# Patient Record
Sex: FEMALE | Race: WHITE | NOT HISPANIC OR LATINO | Employment: OTHER | URBAN - NONMETROPOLITAN AREA
[De-identification: names, ages, dates, MRNs, and addresses within clinical notes are randomized per-mention and may not be internally consistent; named-entity substitution may affect disease eponyms.]

---

## 2023-03-29 ENCOUNTER — TELEMEDICINE (OUTPATIENT)
Dept: PRIMARY CARE | Facility: CLINIC | Age: 53
End: 2023-03-29
Payer: MEDICARE

## 2023-03-29 DIAGNOSIS — J44.1 ASTHMA WITH COPD WITH EXACERBATION (MULTI): Primary | ICD-10-CM

## 2023-03-29 DIAGNOSIS — J45.901 ASTHMA WITH COPD WITH EXACERBATION (MULTI): Primary | ICD-10-CM

## 2023-03-29 PROBLEM — R91.8 LUNG NODULES: Status: ACTIVE | Noted: 2023-03-29

## 2023-03-29 PROBLEM — J44.9 COPD (CHRONIC OBSTRUCTIVE PULMONARY DISEASE) (MULTI): Status: ACTIVE | Noted: 2023-03-29

## 2023-03-29 PROBLEM — R09.02 HYPOXIA: Status: ACTIVE | Noted: 2023-03-29

## 2023-03-29 PROBLEM — Z14.8 ALPHA-1-ANTITRYPSIN DEFICIENCY CARRIER: Status: ACTIVE | Noted: 2023-03-29

## 2023-03-29 PROBLEM — J45.909 ASTHMA (HHS-HCC): Status: ACTIVE | Noted: 2023-03-29

## 2023-03-29 PROBLEM — R92.8 ABNORMAL MAMMOGRAM OF LEFT BREAST: Status: ACTIVE | Noted: 2023-03-29

## 2023-03-29 PROBLEM — F17.210 CIGARETTE NICOTINE DEPENDENCE WITHOUT COMPLICATION: Status: ACTIVE | Noted: 2023-03-29

## 2023-03-29 PROCEDURE — 99214 OFFICE O/P EST MOD 30 MIN: CPT | Performed by: PHYSICIAN ASSISTANT

## 2023-03-29 RX ORDER — IPRATROPIUM BROMIDE AND ALBUTEROL 20; 100 UG/1; UG/1
1 SPRAY, METERED RESPIRATORY (INHALATION) 4 TIMES DAILY PRN
COMMUNITY
End: 2023-10-11 | Stop reason: SDUPTHER

## 2023-03-29 RX ORDER — MONTELUKAST SODIUM 10 MG/1
10 TABLET ORAL NIGHTLY
COMMUNITY
End: 2023-07-18 | Stop reason: SDUPTHER

## 2023-03-29 RX ORDER — LORATADINE 10 MG/1
10 TABLET ORAL DAILY
COMMUNITY
End: 2023-07-18 | Stop reason: SDUPTHER

## 2023-03-29 RX ORDER — DOXYCYCLINE 100 MG/1
100 CAPSULE ORAL 2 TIMES DAILY
Qty: 20 CAPSULE | Refills: 0 | Status: SHIPPED | OUTPATIENT
Start: 2023-03-29 | End: 2023-05-18 | Stop reason: WASHOUT

## 2023-03-29 RX ORDER — PANTOPRAZOLE SODIUM 40 MG/1
40 TABLET, DELAYED RELEASE ORAL
COMMUNITY
End: 2023-11-15 | Stop reason: SDUPTHER

## 2023-03-29 RX ORDER — BUDESONIDE AND FORMOTEROL FUMARATE DIHYDRATE 160; 4.5 UG/1; UG/1
2 AEROSOL RESPIRATORY (INHALATION) 2 TIMES DAILY
COMMUNITY
End: 2023-10-11 | Stop reason: SDUPTHER

## 2023-03-29 RX ORDER — BENRALIZUMAB 30 MG/ML
30 INJECTION, SOLUTION SUBCUTANEOUS
COMMUNITY
End: 2024-01-10 | Stop reason: ALTCHOICE

## 2023-03-29 RX ORDER — PEN NEEDLE, DIABETIC 31 GX5/16"
1 NEEDLE, DISPOSABLE MISCELLANEOUS ONCE
COMMUNITY
Start: 2022-10-14

## 2023-03-29 RX ORDER — ALBUTEROL SULFATE 90 UG/1
2 AEROSOL, METERED RESPIRATORY (INHALATION) EVERY 4 HOURS PRN
COMMUNITY

## 2023-03-29 NOTE — PROGRESS NOTES
Subjective   Patient ID: Tanisha Petersen is a 52 y.o. female who presents for chest cold.    HPI Tanisha Petersen is a 52 y.o. year old female patient with presenting to clinic with concern for cough.      Started over 1 week ago. Travelled to [place] recently. Hx severe asthma and COPD. Quit smoking 2021. Had chills and malaise the first 2 days of cough- granddaughter was recently ill with similar. For Tanisha, the illness progressed and caused sensation of inflammation in her chest and ongoing coughing instead of resolution.  No fevers. No recurrence of malaise.    Past Medical History:   Diagnosis Date    Nicotine dependence, cigarettes, uncomplicated 01/22/2020    Cigarette nicotine dependence without complication    Personal history of nicotine dependence 11/18/2022    History of tobacco abuse    Personal history of other diseases of the respiratory system     History of chronic obstructive lung disease      Current Outpatient Medications:     ipratropium-albuteroL (Combivent Respimat)  mcg/actuation inhaler, Inhale 1 puff 4 times a day as needed for wheezing (do not exceed 6 times daily)., Disp: , Rfl:     nebulizers misc, 1 Units by Not Applicable route 1 time. Patient to use nebulizer machine with DuoNeb every 4 hours as needed for SOB, Disp: , Rfl:     oxygen (O2) therapy, Inhale 4 L/min at 240,000 mL/hr continuously., Disp: , Rfl:     albuterol 90 mcg/actuation inhaler, Inhale 2 puffs every 4 hours if needed for wheezing or shortness of breath., Disp: , Rfl:     benralizumab (Fasenra Pen) 30 mg/mL injection, Inject 1 mL (30 mg) under the skin every 8 (eight) weeks., Disp: , Rfl:     budesonide-formoteroL (Symbicort) 160-4.5 mcg/actuation inhaler, Inhale 2 puffs  in the morning and 2 puffs before bedtime. Rinse mouth with water after use to reduce aftertaste and incidence of candidiasis. Do not swallow.., Disp: , Rfl:     loratadine (Claritin) 10 mg tablet, Take 1 tablet (10 mg) by mouth once daily.,  Disp: , Rfl:     montelukast (Singulair) 10 mg tablet, Take 1 tablet (10 mg) by mouth once daily at bedtime., Disp: , Rfl:     pantoprazole (ProtoNix) 40 mg EC tablet, Take 1 tablet (40 mg) by mouth once daily in the morning. Take before meals., Disp: , Rfl:          Review of Systems  Review of Systems:   Constitutional: Denies fever  HEENT: Denies ST, earache  CVS: Denies Chest pain  Pulmonary: Denies wheezing, SOB  GI: Denies N/V  : Denies dysuria  Musculoskeletal:  Denies myalgia  Neuro: Denies focal weakness or numbness.  Skin: Denies Rashes.  *Review of Systems is negative unless otherwise mentioned in HPI or ROS above.    Objective   There were no vitals taken for this visit.  Pt did not have vitals available for this visit    Physical Exam  Unable to perform physical exam in virtual platform    Assessment/Plan   Problem List Items Addressed This Visit    None  Visit Diagnoses       Asthma with COPD with exacerbation (CMS/[facility])    -  Primary    Relevant Medications    doxycycline (Vibramycin) 100 mg capsule                If you aren't improving over the next fw days, please call the office for an in person exam. If you are worsening before then, please consider an urgent care  ER visit.  Your prescriptions were sent to the pharmacy, please make sure to pick them up and call if there are any issues or questions.     Thank you for trusting me to be a part of your healthcare.    Take care!   Jennyfer Levine PA-C, [facility]

## 2023-05-16 PROBLEM — R31.9 HEMATURIA: Status: RESOLVED | Noted: 2023-05-16 | Resolved: 2023-05-16

## 2023-05-16 PROBLEM — B37.0 ORAL THRUSH: Status: RESOLVED | Noted: 2023-05-16 | Resolved: 2023-05-16

## 2023-05-16 PROBLEM — N76.0 VAGINITIS: Status: RESOLVED | Noted: 2023-05-16 | Resolved: 2023-05-16

## 2023-05-18 ENCOUNTER — OFFICE VISIT (OUTPATIENT)
Dept: PRIMARY CARE | Facility: CLINIC | Age: 53
End: 2023-05-18
Payer: MEDICARE

## 2023-05-18 VITALS
HEIGHT: 67 IN | SYSTOLIC BLOOD PRESSURE: 124 MMHG | OXYGEN SATURATION: 94 % | DIASTOLIC BLOOD PRESSURE: 80 MMHG | WEIGHT: 230.4 LBS | BODY MASS INDEX: 36.16 KG/M2 | HEART RATE: 88 BPM | TEMPERATURE: 98.6 F

## 2023-05-18 DIAGNOSIS — L30.1 CHRONIC VESICULAR HAND DERMATITIS: ICD-10-CM

## 2023-05-18 DIAGNOSIS — R73.03 PRE-DIABETES: ICD-10-CM

## 2023-05-18 DIAGNOSIS — R63.5 ABNORMAL WEIGHT GAIN: Primary | ICD-10-CM

## 2023-05-18 LAB
PNEUMO SEROTYPE INTERPRETATION: NORMAL
SEROTYPE 1, VIRC: 2.74 UG/ML
SEROTYPE 12F, VIRC: 0.14 UG/ML
SEROTYPE 14, VIRC: 5.33 UG/ML
SEROTYPE 18C(56), VIRC: 16.62 UG/ML
SEROTYPE 19F, VIRC: 6.81 UG/ML
SEROTYPE 23F, VIRC: 0.6 UG/ML
SEROTYPE 3, VIRC: 1.01 UG/ML
SEROTYPE 4, VIRC: 4.69 UG/ML
SEROTYPE 5, VIRC: 2.07 UG/ML
SEROTYPE 6B(26), VIRC: 11.14 UG/ML
SEROTYPE 7F(51), VIRC: 11.72 UG/ML
SEROTYPE 8, VIRC: 0.78 UG/ML
SEROTYPE 9N, VIRC: 5.42 UG/ML
SEROTYPE 9V(68), VIRC: 8.82 UG/ML

## 2023-05-18 PROCEDURE — 3008F BODY MASS INDEX DOCD: CPT | Performed by: PHYSICIAN ASSISTANT

## 2023-05-18 PROCEDURE — 1036F TOBACCO NON-USER: CPT | Performed by: PHYSICIAN ASSISTANT

## 2023-05-18 PROCEDURE — 99214 OFFICE O/P EST MOD 30 MIN: CPT | Performed by: PHYSICIAN ASSISTANT

## 2023-05-18 RX ORDER — TRIAMCINOLONE ACETONIDE 1 MG/G
CREAM TOPICAL 2 TIMES DAILY
Qty: 45 G | Refills: 0 | Status: SHIPPED | OUTPATIENT
Start: 2023-05-18 | End: 2023-05-31 | Stop reason: WASHOUT

## 2023-05-18 RX ORDER — METFORMIN HYDROCHLORIDE 500 MG/1
500 TABLET ORAL
Qty: 60 TABLET | Refills: 11 | Status: SHIPPED | OUTPATIENT
Start: 2023-05-18 | End: 2024-01-10 | Stop reason: ALTCHOICE

## 2023-05-18 ASSESSMENT — PATIENT HEALTH QUESTIONNAIRE - PHQ9
2. FEELING DOWN, DEPRESSED OR HOPELESS: NOT AT ALL
1. LITTLE INTEREST OR PLEASURE IN DOING THINGS: NOT AT ALL
SUM OF ALL RESPONSES TO PHQ9 QUESTIONS 1 AND 2: 0

## 2023-05-18 NOTE — PROGRESS NOTES
Subjective   Patient ID: Tanisha Petersen is a 52 y.o. female who presents for Follow-up (Talk about results. Diastolic has been in the 80s normally she is in the low 70s.).    HPI   Tanisha Petersen is a 52 y.o. year old female patient with presenting to clinic with concern for   Chief Complaint   Patient presents with    Follow-up     Talk about results. Diastolic has been in the 80s normally she is in the low 70s.       Tanisha was seen virtually for chest cold 3 week ago after travel to [place]. Hx severe asthma and COPD. Quit smoking . Recovered entirely.    Saw Weight loss clinic. Was working on diet restrition caught covid and has been working on diet restrictions. Has been struggling with weight loss after covid. Getting back on track and wants to start with weight clinic again since she has been tracking calories.  Exercising 2-3 times weekly on treadmill and bicycle    a1C was 5.8% last visit. POCT cartridges on back order. Start metformin- waiting for a1C availablility and concern for insulin resistance d/t truncal obesity, and difficulty losing weight.     Patient Active Problem List   Diagnosis    Abnormal mammogram of left breast    Alpha-1-antitrypsin deficiency carrier    Asthma    Hypoxia    Cigarette nicotine dependence without complication    COPD (chronic obstructive pulmonary disease) (CMS/HCC)    Lung nodules       Past Medical History:   Diagnosis Date    Hematuria 2023    Oral thrush 2023    Personal history of nicotine dependence 2022    History of tobacco abuse      Past Surgical History:   Procedure Laterality Date    HYSTERECTOMY  2017    Hysterectomy    OTHER SURGICAL HISTORY  10/19/2022    Tubal ligation    OTHER SURGICAL HISTORY  10/19/2022     section      No family history on file.   Social History     Tobacco Use    Smoking status: Never    Smokeless tobacco: Never   Vaping Use    Vaping status: Not on file   Substance Use Topics    Alcohol use:  Never            Current Outpatient Medications:     albuterol 90 mcg/actuation inhaler, Inhale 2 puffs every 4 hours if needed for wheezing or shortness of breath., Disp: , Rfl:     benralizumab (Fasenra Pen) 30 mg/mL injection, Inject 1 mL (30 mg) under the skin every 8 (eight) weeks., Disp: , Rfl:     budesonide-formoteroL (Symbicort) 160-4.5 mcg/actuation inhaler, Inhale 2 puffs 2 times a day. Rinse mouth with water after use to reduce aftertaste and incidence of candidiasis. Do not swallow., Disp: , Rfl:     ipratropium-albuteroL (Combivent Respimat)  mcg/actuation inhaler, Inhale 1 puff 4 times a day as needed for wheezing (do not exceed 6 times daily)., Disp: , Rfl:     loratadine (Claritin) 10 mg tablet, Take 1 tablet (10 mg) by mouth once daily., Disp: , Rfl:     montelukast (Singulair) 10 mg tablet, Take 1 tablet (10 mg) by mouth once daily at bedtime., Disp: , Rfl:     nebulizers misc, 1 Units by Not Applicable route 1 time. Patient to use nebulizer machine with DuoNeb every 4 hours as needed for SOB, Disp: , Rfl:     oxygen (O2) therapy, Inhale 4 L/min at 240,000 mL/hr continuously., Disp: , Rfl:     pantoprazole (ProtoNix) 40 mg EC tablet, Take 1 tablet (40 mg) by mouth once daily in the morning. Take before meals., Disp: , Rfl:     metFORMIN (Glucophage) 500 mg tablet, Take 1 tablet (500 mg) by mouth 2 times a day with meals., Disp: 60 tablet, Rfl: 11    triamcinolone (Kenalog) 0.1 % cream, Apply topically 2 times a day for 7 days. Apply to affected area 1-2 times daily as needed., Disp: 45 g, Rfl: 0          Review of Systems  Review of Systems:   Constitutional: Denies fever  HEENT: Denies ST, earache  CVS: Denies Chest pain  Pulmonary: Denies wheezing, SOB  GI: Denies N/V  : Denies dysuria  Musculoskeletal:  Denies myalgia  Neuro: Denies focal weakness or numbness.  Skin: Denies Rashes.  *Review of Systems is negative unless otherwise mentioned in HPI or ROS above.    Objective   /80   " Pulse 88   Temp 37 °C (98.6 °F)   Ht 1.702 m (5' 7\")   Wt 105 kg (230 lb 6.4 oz)   SpO2 94%   BMI 36.09 kg/m²     Physical Exam  Physical exam:  /80   Pulse 88   Temp 37 °C (98.6 °F)   Ht 1.702 m (5' 7\")   Wt 105 kg (230 lb 6.4 oz)   SpO2 94%   BMI 36.09 kg/m²  reviewed   Body mass index is 36.09 kg/m².     Constitutional: NAD.  Resting comfortably.  Head: Atraumatic, normocephalic.  EENT: deferred d/t masking  Cardiac: Regular rate & rhythm.   Pulmonary: Lungs clear bilat  GI: Soft, Nontender, nondistended.   Musculoskeletal: No peripheral edema.   Skin: No evidence of trauma. No rashes  Psych: Intact judgement and insight.    Assessment/Plan   Problem List Items Addressed This Visit    None  Visit Diagnoses       Abnormal weight gain    -  Primary    Relevant Orders    Referral to Comprehensive Weight Loss    Chronic vesicular hand dermatitis        Relevant Medications    triamcinolone (Kenalog) 0.1 % cream    Other Relevant Orders    Referral to Dermatology    Pre-diabetes        Relevant Medications    metFORMIN (Glucophage) 500 mg tablet               "

## 2023-05-31 ENCOUNTER — OFFICE VISIT (OUTPATIENT)
Dept: PRIMARY CARE | Facility: CLINIC | Age: 53
End: 2023-05-31
Payer: MEDICARE

## 2023-05-31 VITALS
TEMPERATURE: 98.6 F | WEIGHT: 234.2 LBS | BODY MASS INDEX: 36.76 KG/M2 | OXYGEN SATURATION: 94 % | SYSTOLIC BLOOD PRESSURE: 112 MMHG | HEIGHT: 67 IN | DIASTOLIC BLOOD PRESSURE: 70 MMHG | HEART RATE: 98 BPM

## 2023-05-31 DIAGNOSIS — J40 BRONCHITIS: Primary | ICD-10-CM

## 2023-05-31 DIAGNOSIS — R73.03 PRE-DIABETES: ICD-10-CM

## 2023-05-31 DIAGNOSIS — N76.0 ACUTE VAGINITIS: ICD-10-CM

## 2023-05-31 DIAGNOSIS — J45.40 MODERATE PERSISTENT ASTHMA WITHOUT COMPLICATION (HHS-HCC): ICD-10-CM

## 2023-05-31 LAB — POC HEMOGLOBIN A1C: 5.5 % (ref 4.2–6.5)

## 2023-05-31 PROCEDURE — 3008F BODY MASS INDEX DOCD: CPT | Performed by: PHYSICIAN ASSISTANT

## 2023-05-31 PROCEDURE — 83036 HEMOGLOBIN GLYCOSYLATED A1C: CPT | Performed by: PHYSICIAN ASSISTANT

## 2023-05-31 PROCEDURE — 1036F TOBACCO NON-USER: CPT | Performed by: PHYSICIAN ASSISTANT

## 2023-05-31 PROCEDURE — 99213 OFFICE O/P EST LOW 20 MIN: CPT | Performed by: PHYSICIAN ASSISTANT

## 2023-05-31 RX ORDER — DOXYCYCLINE 100 MG/1
100 CAPSULE ORAL 2 TIMES DAILY
Qty: 20 CAPSULE | Refills: 0 | Status: SHIPPED | OUTPATIENT
Start: 2023-05-31 | End: 2023-05-31 | Stop reason: SDUPTHER

## 2023-05-31 RX ORDER — FLUCONAZOLE 150 MG/1
150 TABLET ORAL
Qty: 4 TABLET | Refills: 0 | Status: SHIPPED | OUTPATIENT
Start: 2023-05-31 | End: 2023-06-22

## 2023-05-31 RX ORDER — DOXYCYCLINE 100 MG/1
100 CAPSULE ORAL 2 TIMES DAILY
Qty: 20 CAPSULE | Refills: 0 | Status: SHIPPED | OUTPATIENT
Start: 2023-05-31 | End: 2023-06-10

## 2023-05-31 RX ORDER — PREDNISONE 5 MG/1
5 TABLET ORAL DAILY
Qty: 5 TABLET | Refills: 0 | Status: SHIPPED | OUTPATIENT
Start: 2023-05-31 | End: 2023-06-05

## 2023-05-31 ASSESSMENT — PATIENT HEALTH QUESTIONNAIRE - PHQ9
SUM OF ALL RESPONSES TO PHQ9 QUESTIONS 1 AND 2: 0
1. LITTLE INTEREST OR PLEASURE IN DOING THINGS: NOT AT ALL
2. FEELING DOWN, DEPRESSED OR HOPELESS: NOT AT ALL

## 2023-05-31 NOTE — PROGRESS NOTES
"Subjective   Patient ID: Tanisha Petersen is a 52 y.o. female who presents for Chest Congestion (Was put on a steroid last time she was in. Funny taste in her mouth when she coughs. Needs an abx. ).    HPI   Had fasenra 2 weeks ago. Thought it was just bronchitis- took steroids. Concerned It hasn't gone away. Has felt difficulty expectorating. When she expectorates it is dark and strange tasting.  Cannot go off of steroid 10mg without significant SOB and EVANS.     Review of Systems  Review of Systems:   Constitutional: Denies fever  HEENT: Denies ST, earache  CVS: Denies Chest pain  Pulmonary: Denies wheezing, SOB  GI: Denies N/V  : Denies dysuria  Musculoskeletal:  Denies myalgia  Neuro: Denies focal weakness or numbness.  Skin: Denies Rashes.  *Review of Systems is negative unless otherwise mentioned in HPI or ROS above.    Objective   /70   Pulse 98   Temp 37 °C (98.6 °F)   Ht 1.702 m (5' 7\")   Wt 106 kg (234 lb 3.2 oz)   SpO2 94%   BMI 36.68 kg/m²     Physical Exam    Physical exam:  /70   Pulse 98   Temp 37 °C (98.6 °F)   Ht 1.702 m (5' 7\")   Wt 106 kg (234 lb 3.2 oz)   SpO2 94%   BMI 36.68 kg/m²  reviewed   Body mass index is 36.68 kg/m².     Constitutional: NAD.  Resting comfortably.  Head: Atraumatic, normocephalic.  EENT: deferred d/t masking  Cardiac: Regular rate & rhythm.   Pulmonary: Lungs clear bilat  GI: Soft, Nontender, nondistended.   Musculoskeletal: No peripheral edema.   Skin: No evidence of trauma. No rashes  Psych: Intact judgement and insight.    Physical exam:  /70   Pulse 98   Temp 37 °C (98.6 °F)   Ht 1.702 m (5' 7\")   Wt 106 kg (234 lb 3.2 oz)   SpO2 94%   BMI 36.68 kg/m²  reviewed   Body mass index is 36.68 kg/m².     Constitutional: NAD.  Resting comfortably.  Head: Atraumatic, normocephalic.  ENT: moist oral mucosa. Nasal mucosa mildly edematous and nonerythematous. Posterior oropharynx nonerythematous with mild clear posterior pharyngeal " streaking.  TM: Bilat TM nonerythematous, pearly grey, landmarks intact. EAC wnl bilat.  Eyes: PERRLA. EOM intact.   Lymph: No anterior cervical chain lymphadenopathy or submandibular lymphadenopathy.   Cardiac: Regular rate & rhythm.   Pulmonary: Lungs- scattered wheezing.  GI: Soft, Nontender, nondistended.   Musculoskeletal: No peripheral edema.   Skin: No evidence of trauma. No rashes  Psych: Intact judgement and insight.    Assessment/Plan   Problem List Items Addressed This Visit       Asthma - Primary    Relevant Medications    doxycycline (Vibramycin) 100 mg capsule     Other Visit Diagnoses       Pre-diabetes        Relevant Orders    POCT glycosylated hemoglobin (Hb A1C) manually resulted (Completed)    Bronchitis        Relevant Medications    doxycycline (Vibramycin) 100 mg capsule

## 2023-07-18 DIAGNOSIS — J45.40 MODERATE PERSISTENT ASTHMA WITHOUT COMPLICATION (HHS-HCC): ICD-10-CM

## 2023-07-18 RX ORDER — MONTELUKAST SODIUM 10 MG/1
10 TABLET ORAL NIGHTLY
Qty: 90 TABLET | Refills: 0 | Status: SHIPPED | OUTPATIENT
Start: 2023-07-18 | End: 2023-11-15 | Stop reason: SDUPTHER

## 2023-07-18 RX ORDER — LORATADINE 10 MG/1
10 TABLET ORAL DAILY
Qty: 90 TABLET | Refills: 1 | Status: SHIPPED | OUTPATIENT
Start: 2023-07-18 | End: 2023-11-15 | Stop reason: SDUPTHER

## 2023-08-17 ENCOUNTER — TELEPHONE (OUTPATIENT)
Dept: PRIMARY CARE | Facility: CLINIC | Age: 53
End: 2023-08-17
Payer: MEDICAID

## 2023-08-17 NOTE — TELEPHONE ENCOUNTER
Has been having heart flutters for past week, no other symptoms.  Does not home monitor bp or any other vitals.  No availability for 1 week - please advise.

## 2023-08-30 ENCOUNTER — OFFICE VISIT (OUTPATIENT)
Dept: PRIMARY CARE | Facility: CLINIC | Age: 53
End: 2023-08-30
Payer: MEDICARE

## 2023-08-30 VITALS
TEMPERATURE: 96.7 F | OXYGEN SATURATION: 94 % | HEIGHT: 67 IN | BODY MASS INDEX: 37.26 KG/M2 | WEIGHT: 237.4 LBS | SYSTOLIC BLOOD PRESSURE: 102 MMHG | HEART RATE: 71 BPM | DIASTOLIC BLOOD PRESSURE: 70 MMHG

## 2023-08-30 DIAGNOSIS — R00.2 PALPITATIONS: Primary | ICD-10-CM

## 2023-08-30 DIAGNOSIS — K21.9 GASTROESOPHAGEAL REFLUX DISEASE WITHOUT ESOPHAGITIS: ICD-10-CM

## 2023-08-30 DIAGNOSIS — J45.40 MODERATE PERSISTENT ASTHMA WITHOUT COMPLICATION (HHS-HCC): ICD-10-CM

## 2023-08-30 DIAGNOSIS — R07.89 OTHER CHEST PAIN: ICD-10-CM

## 2023-08-30 PROCEDURE — 99214 OFFICE O/P EST MOD 30 MIN: CPT | Performed by: PHYSICIAN ASSISTANT

## 2023-08-30 PROCEDURE — 1036F TOBACCO NON-USER: CPT | Performed by: PHYSICIAN ASSISTANT

## 2023-08-30 PROCEDURE — 3008F BODY MASS INDEX DOCD: CPT | Performed by: PHYSICIAN ASSISTANT

## 2023-08-30 RX ORDER — FAMOTIDINE 40 MG/1
40 TABLET, FILM COATED ORAL NIGHTLY
Qty: 90 TABLET | Refills: 1 | Status: SHIPPED | OUTPATIENT
Start: 2023-08-30 | End: 2023-11-15 | Stop reason: SDUPTHER

## 2023-08-30 NOTE — PROGRESS NOTES
"Subjective     HPI   Tanisha Petersen is a 53 y.o. year old female patient with presenting to clinic with concern for   Chief Complaint   Patient presents with    ER Follow-up     For chest pain. Has pain on and off. No pain currently.        Tanisha is an established patient who presents to clinic with concern for palpitations. Recurrent palpitations and chest pressure/pulling sensation for 2 weeks.    Mid chest pulling sensation and pain and stiffness substernal. She went to the ER. Saw something that looked different in EKG and \"PVC\" light up when she felt.       Echo reviewed from 10/7/2022   CONCLUSIONS:   1. Left ventricular systolic function is normal with a 55-60% estimated ejection fraction.   2. Excessive respiratory changes-consider tamponade, ventilation related, etc.   3. Spectral Doppler shows an impaired relaxation pattern of left ventricular diastolic filling.    Fasenra seems to exac respiratory changes and cough 1 week after fasenra shots.    Patient Active Problem List   Diagnosis    Abnormal mammogram of left breast    Alpha-1-antitrypsin deficiency carrier    Asthma    Hypoxia    Cigarette nicotine dependence without complication    COPD (chronic obstructive pulmonary disease) (CMS/[facility])    Lung nodules       Past Medical History:   Diagnosis Date    Hematuria 2023    Oral thrush 2023    Personal history of nicotine dependence 2022    History of tobacco abuse      Past Surgical History:   Procedure Laterality Date    HYSTERECTOMY  2017    Hysterectomy    OTHER SURGICAL HISTORY  10/19/2022    Tubal ligation    OTHER SURGICAL HISTORY  10/19/2022     section      No family history on file.   Social History     Tobacco Use    Smoking status: Never    Smokeless tobacco: Never   Substance Use Topics    Alcohol use: Never        Current Outpatient Medications:     albuterol 90 mcg/actuation inhaler, Inhale 2 puffs every 4 hours if needed for wheezing or shortness of " "breath., Disp: , Rfl:     benralizumab (Fasenra Pen) 30 mg/mL injection, Inject 1 mL (30 mg) under the skin every 8 (eight) weeks., Disp: , Rfl:     budesonide-formoteroL (Symbicort) 160-4.5 mcg/actuation inhaler, Inhale 2 puffs 2 times a day. Rinse mouth with water after use to reduce aftertaste and incidence of candidiasis. Do not swallow., Disp: , Rfl:     ipratropium-albuteroL (Combivent Respimat)  mcg/actuation inhaler, Inhale 1 puff 4 times a day as needed for wheezing (do not exceed 6 times daily)., Disp: , Rfl:     loratadine (Claritin) 10 mg tablet, Take 1 tablet (10 mg) by mouth once daily., Disp: 90 tablet, Rfl: 1    metFORMIN (Glucophage) 500 mg tablet, Take 1 tablet (500 mg) by mouth 2 times a day with meals., Disp: 60 tablet, Rfl: 11    montelukast (Singulair) 10 mg tablet, Take 1 tablet (10 mg) by mouth once daily at bedtime., Disp: 90 tablet, Rfl: 0    nebulizers misc, 1 Units by Not Applicable route 1 time. Patient to use nebulizer machine with DuoNeb every 4 hours as needed for SOB, Disp: , Rfl:     oxygen (O2) therapy, Inhale 4 L/min at 240,000 mL/hr continuously., Disp: , Rfl:     pantoprazole (ProtoNix) 40 mg EC tablet, Take 1 tablet (40 mg) by mouth once daily in the morning. Take before meals., Disp: , Rfl:      Review of Systems  Constitutional: Denies fever  HEENT: Denies ST, earache  CVS: Denies Chest pain  Pulmonary: Denies wheezing, SOB  GI: Denies N/V  : Denies dysuria  Musculoskeletal:  Denies myalgia  Neuro: Denies focal weakness or numbness.  Skin: Denies Rashes.  *Review of Systems is negative unless otherwise mentioned in HPI or ROS above.    Objective   /70   Pulse 71   Temp 35.9 °C (96.7 °F)   Ht 1.702 m (5' 7\")   Wt 108 kg (237 lb 6.4 oz)   SpO2 94%   BMI 37.18 kg/m²  reviewed Body mass index is 37.18 kg/m².     Physical Exam  Constitutional: NAD.  Resting comfortably.  Head: Atraumatic, normocephalic.  EENT: deferred d/t masking  Cardiac: Regular rate & " rhythm.   Pulmonary: Lungs clear bilat  GI: Soft, Nontender, nondistended.   Musculoskeletal: No peripheral edema.   Skin: No evidence of trauma. No rashes  Psych: Intact judgement and insight.    .Assessment/Plan   Problem List Items Addressed This Visit       Asthma    Relevant Orders    Referral to Cardiology     Other Visit Diagnoses       Palpitations    -  Primary    Relevant Orders    Referral to Cardiology    Nuclear Stress Test    Transthoracic Echo (TTE) Complete    Other chest pain        Relevant Orders    Nuclear Stress Test    Transthoracic Echo (TTE) Complete

## 2023-10-10 ENCOUNTER — HOSPITAL ENCOUNTER (OUTPATIENT)
Dept: RADIOLOGY | Facility: HOSPITAL | Age: 53
Discharge: HOME | End: 2023-10-10
Payer: MEDICARE

## 2023-10-10 ENCOUNTER — HOSPITAL ENCOUNTER (OUTPATIENT)
Dept: CARDIOLOGY | Facility: HOSPITAL | Age: 53
Discharge: HOME | End: 2023-10-10
Payer: MEDICARE

## 2023-10-10 DIAGNOSIS — R00.2 PALPITATIONS: ICD-10-CM

## 2023-10-10 DIAGNOSIS — R07.89 OTHER CHEST PAIN: ICD-10-CM

## 2023-10-10 PROCEDURE — 93018 CV STRESS TEST I&R ONLY: CPT | Performed by: STUDENT IN AN ORGANIZED HEALTH CARE EDUCATION/TRAINING PROGRAM

## 2023-10-10 PROCEDURE — 93016 CV STRESS TEST SUPVJ ONLY: CPT | Performed by: STUDENT IN AN ORGANIZED HEALTH CARE EDUCATION/TRAINING PROGRAM

## 2023-10-10 PROCEDURE — A9502 TC99M TETROFOSMIN: HCPCS | Mod: SE | Performed by: PHYSICIAN ASSISTANT

## 2023-10-10 PROCEDURE — 78452 HT MUSCLE IMAGE SPECT MULT: CPT | Performed by: STUDENT IN AN ORGANIZED HEALTH CARE EDUCATION/TRAINING PROGRAM

## 2023-10-10 PROCEDURE — 93017 CV STRESS TEST TRACING ONLY: CPT

## 2023-10-10 PROCEDURE — 3430000001 HC RX 343 DIAGNOSTIC RADIOPHARMACEUTICALS: Mod: SE | Performed by: PHYSICIAN ASSISTANT

## 2023-10-10 PROCEDURE — 78452 HT MUSCLE IMAGE SPECT MULT: CPT

## 2023-10-10 PROCEDURE — 2500000004 HC RX 250 GENERAL PHARMACY W/ HCPCS (ALT 636 FOR OP/ED): Mod: SE | Performed by: PHYSICIAN ASSISTANT

## 2023-10-10 RX ORDER — REGADENOSON 0.08 MG/ML
0.4 INJECTION, SOLUTION INTRAVENOUS ONCE
Status: COMPLETED | OUTPATIENT
Start: 2023-10-10 | End: 2023-10-10

## 2023-10-10 RX ADMIN — REGADENOSON 0.4 MG: 0.08 INJECTION, SOLUTION INTRAVENOUS at 09:25

## 2023-10-10 RX ADMIN — TETROFOSMIN 11.6 MILLICURIE: 0.23 INJECTION, POWDER, LYOPHILIZED, FOR SOLUTION INTRAVENOUS at 08:03

## 2023-10-10 RX ADMIN — TETROFOSMIN 34.1 MILLICURIE: 0.23 INJECTION, POWDER, LYOPHILIZED, FOR SOLUTION INTRAVENOUS at 09:32

## 2023-10-11 DIAGNOSIS — J43.2 CENTRILOBULAR EMPHYSEMA (MULTI): Primary | ICD-10-CM

## 2023-10-11 RX ORDER — IPRATROPIUM BROMIDE AND ALBUTEROL 20; 100 UG/1; UG/1
1 SPRAY, METERED RESPIRATORY (INHALATION) 4 TIMES DAILY PRN
Qty: 4 G | Refills: 5 | Status: SHIPPED | OUTPATIENT
Start: 2023-10-11 | End: 2024-01-10 | Stop reason: SDUPTHER

## 2023-10-11 RX ORDER — BUDESONIDE AND FORMOTEROL FUMARATE DIHYDRATE 160; 4.5 UG/1; UG/1
2 AEROSOL RESPIRATORY (INHALATION) 2 TIMES DAILY
Qty: 1 EACH | Refills: 5 | Status: SHIPPED | OUTPATIENT
Start: 2023-10-11 | End: 2024-01-10 | Stop reason: SDUPTHER

## 2023-10-27 DIAGNOSIS — J44.1 COPD WITH ACUTE EXACERBATION (MULTI): Primary | ICD-10-CM

## 2023-10-27 RX ORDER — DOXYCYCLINE 100 MG/1
100 CAPSULE ORAL 2 TIMES DAILY
Qty: 20 CAPSULE | Refills: 0 | Status: SHIPPED | OUTPATIENT
Start: 2023-10-27 | End: 2023-11-06

## 2023-11-15 ENCOUNTER — OFFICE VISIT (OUTPATIENT)
Dept: PRIMARY CARE | Facility: CLINIC | Age: 53
End: 2023-11-15
Payer: MEDICARE

## 2023-11-15 VITALS
HEIGHT: 67 IN | SYSTOLIC BLOOD PRESSURE: 108 MMHG | DIASTOLIC BLOOD PRESSURE: 74 MMHG | WEIGHT: 242.4 LBS | TEMPERATURE: 98.6 F | BODY MASS INDEX: 38.04 KG/M2 | OXYGEN SATURATION: 98 % | HEART RATE: 75 BPM

## 2023-11-15 DIAGNOSIS — E55.9 VITAMIN D INSUFFICIENCY: ICD-10-CM

## 2023-11-15 DIAGNOSIS — K21.9 GASTROESOPHAGEAL REFLUX DISEASE WITHOUT ESOPHAGITIS: ICD-10-CM

## 2023-11-15 DIAGNOSIS — R73.03 PRE-DIABETES: ICD-10-CM

## 2023-11-15 DIAGNOSIS — J44.9 CHRONIC OBSTRUCTIVE PULMONARY DISEASE, UNSPECIFIED COPD TYPE (MULTI): Primary | ICD-10-CM

## 2023-11-15 DIAGNOSIS — Z78.0 ASYMPTOMATIC POSTMENOPAUSAL STATE: ICD-10-CM

## 2023-11-15 DIAGNOSIS — R06.2 WHEEZING: ICD-10-CM

## 2023-11-15 DIAGNOSIS — Z12.11 SPECIAL SCREENING FOR MALIGNANT NEOPLASM OF COLON: ICD-10-CM

## 2023-11-15 DIAGNOSIS — J45.40 MODERATE PERSISTENT ASTHMA WITHOUT COMPLICATION (HHS-HCC): ICD-10-CM

## 2023-11-15 DIAGNOSIS — E83.42 HYPOMAGNESEMIA: ICD-10-CM

## 2023-11-15 DIAGNOSIS — Z12.31 SCREENING MAMMOGRAM FOR BREAST CANCER: ICD-10-CM

## 2023-11-15 DIAGNOSIS — Z79.899 CURRENT USE OF PROTON PUMP INHIBITOR: ICD-10-CM

## 2023-11-15 DIAGNOSIS — E78.5 BORDERLINE HYPERLIPIDEMIA: ICD-10-CM

## 2023-11-15 DIAGNOSIS — F17.218 CIGARETTE NICOTINE DEPENDENCE WITH OTHER NICOTINE-INDUCED DISORDER: ICD-10-CM

## 2023-11-15 PROCEDURE — 99214 OFFICE O/P EST MOD 30 MIN: CPT | Performed by: PHYSICIAN ASSISTANT

## 2023-11-15 PROCEDURE — 3008F BODY MASS INDEX DOCD: CPT | Performed by: PHYSICIAN ASSISTANT

## 2023-11-15 RX ORDER — PANTOPRAZOLE SODIUM 40 MG/1
40 TABLET, DELAYED RELEASE ORAL
Qty: 90 TABLET | Refills: 1 | Status: SHIPPED | OUTPATIENT
Start: 2023-11-15 | End: 2024-05-22 | Stop reason: SDUPTHER

## 2023-11-15 RX ORDER — PREDNISONE 10 MG/1
10 TABLET ORAL DAILY
Qty: 10 TABLET | Refills: 0 | Status: SHIPPED | OUTPATIENT
Start: 2023-11-15 | End: 2023-11-25

## 2023-11-15 RX ORDER — LORATADINE 10 MG/1
10 TABLET ORAL DAILY
Qty: 90 TABLET | Refills: 1 | Status: SHIPPED | OUTPATIENT
Start: 2023-11-15 | End: 2024-05-22

## 2023-11-15 RX ORDER — DOXYCYCLINE 100 MG/1
100 CAPSULE ORAL EVERY 12 HOURS
Qty: 30 CAPSULE | Refills: 0 | Status: CANCELLED | OUTPATIENT
Start: 2023-11-15

## 2023-11-15 RX ORDER — VARENICLINE TARTRATE 1 MG/1
1 TABLET, FILM COATED ORAL 2 TIMES DAILY
Qty: 120 TABLET | Refills: 0 | Status: SHIPPED | OUTPATIENT
Start: 2023-12-15 | End: 2024-01-10 | Stop reason: ALTCHOICE

## 2023-11-15 RX ORDER — DOXYCYCLINE 100 MG/1
1 CAPSULE ORAL EVERY 12 HOURS
COMMUNITY
Start: 2023-03-01 | End: 2024-01-10 | Stop reason: ALTCHOICE

## 2023-11-15 RX ORDER — FAMOTIDINE 40 MG/1
40 TABLET, FILM COATED ORAL NIGHTLY
Qty: 90 TABLET | Refills: 1 | Status: SHIPPED | OUTPATIENT
Start: 2023-11-15 | End: 2024-05-30

## 2023-11-15 RX ORDER — VARENICLINE TARTRATE 1 MG/1
TABLET, FILM COATED ORAL
Qty: 53 TABLET | Refills: 0 | Status: SHIPPED | OUTPATIENT
Start: 2023-11-15 | End: 2024-01-10 | Stop reason: ALTCHOICE

## 2023-11-15 RX ORDER — MONTELUKAST SODIUM 10 MG/1
10 TABLET ORAL NIGHTLY
Qty: 90 TABLET | Refills: 1 | Status: SHIPPED | OUTPATIENT
Start: 2023-11-15 | End: 2024-05-22 | Stop reason: SDUPTHER

## 2023-11-15 ASSESSMENT — PATIENT HEALTH QUESTIONNAIRE - PHQ9
2. FEELING DOWN, DEPRESSED OR HOPELESS: NOT AT ALL
SUM OF ALL RESPONSES TO PHQ9 QUESTIONS 1 AND 2: 0
1. LITTLE INTEREST OR PLEASURE IN DOING THINGS: NOT AT ALL

## 2023-11-15 ASSESSMENT — ACTIVITIES OF DAILY LIVING (ADL)
DOING_HOUSEWORK: INDEPENDENT
TAKING_MEDICATION: INDEPENDENT
GROCERY_SHOPPING: INDEPENDENT
DRESSING: INDEPENDENT
MANAGING_FINANCES: INDEPENDENT
BATHING: INDEPENDENT

## 2023-11-15 NOTE — PATIENT INSTRUCTIONS
I applaud your interest in quitting smoking. I'm happy to help in any way I can. Please consider the options we discussed and what might best work for you. Chantix is an option to keep smoking and cut down a bit at a time until you quit. It can cause some strange dreams and other feelings. Patches are an option also, but you need to quit cold turkey on patches so it doesn't actually increase your nicotine dose. Don't start vaping instead of smoking! Vapes can actually have more nicotine than cigarettes.    Deciding to quit smoking is a great start. Choose a day.   The cup method is a slow way to cut back on the number of cigarettes daily. Put all the cigarettes in a cup in a safe visible place (away from children). That's all you get for the day and you can see how many there are. Then, after a few days, take one away at the beginning of the day, so instead of a pack a day (20), you have 19 cigarettes to last the day. Start by cutting out the least important smoke break for you first in the day, try to find a replacement for the hand to mouth action of smoking, like eating crunchy carrot sticks or celery sticks, a dumdum sucker, or sipping tea (but no coffee if that's associated with smoking for you!).   If your craving is too strong, have a piece of nicotine gum or a lozenge, but make sure to chew it properly, not like regular gum! Bit the gum once or twice until you feel tingling in your mouth. Then tuck the gum between the inside of your cheek by your gums (similar to chewing tobacco). Hold it for about a minute to let the nicotine absorb into your body, chomp once or twice again to release more nicotine. It should last as long as a cigarette.  Don't let one rough day with a bad decision completely derail you! Its just a speedbump. Continue your progress tomorrow!     1-800-QUIT-NOW for immediate resources at the Quit Smoking Hotline.

## 2023-11-15 NOTE — PROGRESS NOTES
Subjective     HPI   Tanisha Petersen is a 53 y.o. year old female patient with presenting to clinic with concern for   Chief Complaint   Patient presents with    Medicare Annual Wellness Visit Initial       Started smoking again on vacation. Requests chantix. Coughing and wheezing.      Hx chest pressure and palpitations. Saw cardiology.     Stress test 10/10/23  Summary:   1. Adequate level of stress achieved.   2. No clinical or electrocardiographic evidence for ischemia at a maximal infusion.   3. Nuclear image results are reported separately.     [id number] Rizwan Medrano MD  Electronically signed on 10/11/2023 at 10:43:42 AM    Echo reviewed from 10/7/2022   CONCLUSIONS:   1. Left ventricular systolic function is normal with a 55-60% estimated ejection fraction.   2. Excessive respiratory changes-consider tamponade, ventilation related, etc.   3. Spectral Doppler shows an impaired relaxation pattern of left ventricular diastolic filling.    Fasenra seems to exac respiratory changes and cough 1 week after fasenra shots.    Pulm PFT scheduled in January Dr Vallejo.     Patient Active Problem List   Diagnosis    Abnormal mammogram of left breast    Alpha-1-antitrypsin deficiency carrier    Asthma    Hypoxia    Cigarette nicotine dependence without complication    COPD (chronic obstructive pulmonary disease) (CMS/[facility])    Lung nodules       Past Medical History:   Diagnosis Date    Hematuria 2023    Oral thrush 2023    Personal history of nicotine dependence 2022    History of tobacco abuse      Past Surgical History:   Procedure Laterality Date    HYSTERECTOMY  2017    Hysterectomy    OTHER SURGICAL HISTORY  10/19/2022    Tubal ligation    OTHER SURGICAL HISTORY  10/19/2022     section      Family History   Problem Relation Name Age of Onset    No Known Problems Other        Social History     Tobacco Use    Smoking status: Every Day     Types: Cigarettes    Smokeless tobacco: Never     Tobacco comments:     Started smoking again after vacation with a friend. Needs help quitting.    Substance Use Topics    Alcohol use: Never        Current Outpatient Medications:     albuterol 90 mcg/actuation inhaler, Inhale 2 puffs every 4 hours if needed for wheezing or shortness of breath., Disp: , Rfl:     benralizumab (Fasenra Pen) 30 mg/mL injection, Inject 1 mL (30 mg) under the skin every 8 (eight) weeks., Disp: , Rfl:     budesonide-formoteroL (Symbicort) 160-4.5 mcg/actuation inhaler, Inhale 2 puffs 2 times a day. Rinse mouth with water after use to reduce aftertaste and incidence of candidiasis. Do not swallow., Disp: 1 each, Rfl: 5    doxycycline (Vibramycin) 100 mg capsule, Take 1 capsule (100 mg) by mouth every 12 hours., Disp: , Rfl:     ipratropium-albuteroL (Combivent Respimat)  mcg/actuation inhaler, Inhale 1 puff 4 times a day as needed for wheezing (do not exceed 6 times daily)., Disp: 4 g, Rfl: 5    nebulizers misc, 1 Units by Not Applicable route 1 time. Patient to use nebulizer machine with DuoNeb every 4 hours as needed for SOB, Disp: , Rfl:     oxygen (O2) therapy, Inhale 4 L/min at 240,000 mL/hr continuously., Disp: , Rfl:     famotidine (Pepcid) 40 mg tablet, Take 1 tablet (40 mg) by mouth once daily at bedtime., Disp: 90 tablet, Rfl: 1    loratadine (Claritin) 10 mg tablet, Take 1 tablet (10 mg) by mouth once daily., Disp: 90 tablet, Rfl: 1    metFORMIN (Glucophage) 500 mg tablet, Take 1 tablet (500 mg) by mouth 2 times a day with meals. (Patient not taking: Reported on 11/15/2023), Disp: 60 tablet, Rfl: 11    montelukast (Singulair) 10 mg tablet, Take 1 tablet (10 mg) by mouth once daily at bedtime., Disp: 90 tablet, Rfl: 1    pantoprazole (ProtoNix) 40 mg EC tablet, Take 1 tablet (40 mg) by mouth once daily in the morning. Take before meals., Disp: 90 tablet, Rfl: 1    predniSONE (Deltasone) 10 mg tablet, Take 1 tablet (10 mg) by mouth once daily for 10 days., Disp: 10 tablet,  "Rfl: 0    varenicline (Chantix) 1 mg tablet, Take 0.5 tablets (0.5 mg) by mouth once daily for 3 days, THEN 0.5 tablets (0.5 mg) 2 times a day for 4 days, THEN 1 tablet (1 mg) 2 times a day for 24 days. Take with full glass of water.., Disp: 53 tablet, Rfl: 0    [START ON 12/15/2023] varenicline (Chantix) 1 mg tablet, Take 1 tablet (1 mg) by mouth 2 times a day. Take with full glass of water. Do not start before December 15, 2023., Disp: 120 tablet, Rfl: 0     Review of Systems  Constitutional: Denies fever  HEENT: Denies ST, earache  CVS: Denies Chest pain  Pulmonary: Denies wheezing, SOB  GI: Denies N/V  : Denies dysuria  Musculoskeletal:  Denies myalgia  Neuro: Denies focal weakness or numbness.  Skin: Denies Rashes.  *Review of Systems is negative unless otherwise mentioned in HPI or ROS above.    Objective   /74   Pulse 75   Temp 37 °C (98.6 °F)   Ht 1.702 m (5' 7\")   Wt 110 kg (242 lb 6.4 oz)   SpO2 98%   BMI 37.97 kg/m²  reviewed Body mass index is 37.97 kg/m².     Physical Exam  Constitutional: NAD.  Resting comfortably.  Head: Atraumatic, normocephalic.  ENT: Moist oral mucosa. Nasal mucosa wnl.   Cardiac: Regular rate & rhythm.   Pulmonary: Lungs with wheezing throughout. No rhonchi  GI: Soft, Nontender, nondistended.   Musculoskeletal: No peripheral edema.   Skin: No evidence of trauma. No rashes  Psych: Intact judgement and insight.    .Assessment/Plan   Problem List Items Addressed This Visit             ICD-10-CM    Asthma J45.909    Relevant Medications    montelukast (Singulair) 10 mg tablet    loratadine (Claritin) 10 mg tablet    COPD (chronic obstructive pulmonary disease) (CMS/[facility]) - Primary J44.9    Relevant Orders    CBC    Comprehensive Metabolic Panel     Other Visit Diagnoses         Codes    Borderline hyperlipidemia     E78.5    Relevant Orders    CBC    Comprehensive Metabolic Panel    TSH with reflex to Free T4 if abnormal    Lipid Panel    Vitamin D insufficiency     E55.9 "    Relevant Orders    Vitamin D 25-Hydroxy,Total (for eval of Vitamin D levels)    Current use of proton pump inhibitor     Z79.899    Relevant Orders    Magnesium    Hypomagnesemia     E83.42    Relevant Orders    Magnesium    Asymptomatic postmenopausal state     Z78.0    Screening mammogram for breast cancer     Z12.31    Relevant Orders    BI mammo bilateral screening tomosynthesis    Special screening for malignant neoplasm of colon     Z12.11    Gastroesophageal reflux disease without esophagitis     K21.9    Relevant Medications    pantoprazole (ProtoNix) 40 mg EC tablet    famotidine (Pepcid) 40 mg tablet    Cigarette nicotine dependence with other nicotine-induced disorder     F17.218    Relevant Medications    varenicline (Chantix) 1 mg tablet    varenicline (Chantix) 1 mg tablet (Start on 12/15/2023)    Pre-diabetes     R73.03    Relevant Orders    Hemoglobin A1c    Wheezing     R06.2    Relevant Medications    predniSONE (Deltasone) 10 mg tablet

## 2023-11-20 ENCOUNTER — TELEMEDICINE (OUTPATIENT)
Dept: CARDIOLOGY | Facility: CLINIC | Age: 53
End: 2023-11-20
Payer: MEDICARE

## 2023-11-20 DIAGNOSIS — R00.2 PALPITATIONS: Primary | ICD-10-CM

## 2023-11-20 DIAGNOSIS — J43.9 PULMONARY EMPHYSEMA, UNSPECIFIED EMPHYSEMA TYPE (MULTI): ICD-10-CM

## 2023-11-20 DIAGNOSIS — E66.9 CLASS 2 OBESITY WITHOUT SERIOUS COMORBIDITY WITH BODY MASS INDEX (BMI) OF 37.0 TO 37.9 IN ADULT, UNSPECIFIED OBESITY TYPE: ICD-10-CM

## 2023-11-20 DIAGNOSIS — R07.89 OTHER CHEST PAIN: ICD-10-CM

## 2023-11-20 DIAGNOSIS — E11.9 DIABETES MELLITUS TYPE II, NON INSULIN DEPENDENT (MULTI): ICD-10-CM

## 2023-11-20 PROCEDURE — 99205 OFFICE O/P NEW HI 60 MIN: CPT | Performed by: INTERNAL MEDICINE

## 2023-11-20 NOTE — PROGRESS NOTES
DIAGNOSES: Palpitations. PVCs. Exertional shortness of breath -COPD. Preserved LV systolic function. Negative stress test (10/10/2023).  Ex smoker. T2DM. Obesity.     Dear Ms Levine,    I had the pleasure of consulting with Mr Jennifer mazariegos today. As you know, Tanisha is a very pleasant 53 year old lady, having symptoms of palpitations with ER visit on 2023, revealing frequent PVCs, which have since subsided without any further symptoms. Her further cardiac evaluation so far has returned negative, with preserved LV function on echo and negative regadenoson MIB stress test on 10/10/2023. She denies any exertional chest pain.   On regular follow up with Dr Vallejo for her COPD. Had a hospitalization for respiratory failure in 10/2022, and since then she had quit smoking    12 system review was negative except as stated above    PMH:  As above  Ex smoker, COPD. H/o Alpha 1 antitrypsin deficiency trait  T2DM  S/p Hysterectomy for abnormal Pap smear.     SH:   Ex smoker  No h/o alcohol abuse   for >15 years    FH: Mother  of lung CA in her 70s. Father  in his 50s presumably from complications related to drinking alcohol    Allergies: As noted in charted    Medications: a As noted    Data: All available data were personally reviewed by me.     DIAGNOSES: Palpitations. PVCs. Exertional shortness of breath -COPD. Preserved LV systolic function. Negative stress test (10/10/2023).  Ex smoker. T2DM. Obesity.     In view of her symptoms of palpitations, I have requested a 2 weeks event monitor.  I reassured her from a cardiac perspective and advised her to continue with normal activities as tolerated.  I congratulated her on quitting smoking and urged her to stay away from tobacco permanently.  I will keep you updated with the event monitor results.    Thank you so much for the opportunity to participate in the care of this very pleasant lady. Please do not hesitate to contact me if I could be of any  assistance in her future care.    Sincerely        Lisa Adan M.D.

## 2023-11-21 ENCOUNTER — PATIENT MESSAGE (OUTPATIENT)
Dept: PRIMARY CARE | Facility: CLINIC | Age: 53
End: 2023-11-21
Payer: MEDICAID

## 2023-11-21 DIAGNOSIS — Z20.828 RSV EXPOSURE: ICD-10-CM

## 2023-11-21 DIAGNOSIS — J40 BRONCHITIS: Primary | ICD-10-CM

## 2023-11-22 RX ORDER — DOXYCYCLINE 100 MG/1
100 CAPSULE ORAL 2 TIMES DAILY
Qty: 20 CAPSULE | Refills: 0 | Status: SHIPPED | OUTPATIENT
Start: 2023-11-22 | End: 2023-12-02

## 2023-11-22 RX ORDER — PREDNISONE 20 MG/1
TABLET ORAL
Qty: 18 TABLET | Refills: 0 | Status: SHIPPED | OUTPATIENT
Start: 2023-11-22 | End: 2024-02-28 | Stop reason: WASHOUT

## 2023-12-06 ENCOUNTER — APPOINTMENT (OUTPATIENT)
Dept: CARDIOLOGY | Facility: HOSPITAL | Age: 53
End: 2023-12-06
Payer: MEDICARE

## 2023-12-07 ENCOUNTER — HOSPITAL ENCOUNTER (OUTPATIENT)
Dept: RADIOLOGY | Facility: HOSPITAL | Age: 53
Discharge: HOME | End: 2023-12-07
Payer: MEDICARE

## 2023-12-07 ENCOUNTER — LAB (OUTPATIENT)
Dept: LAB | Facility: LAB | Age: 53
End: 2023-12-07
Payer: MEDICAID

## 2023-12-07 ENCOUNTER — HOSPITAL ENCOUNTER (OUTPATIENT)
Dept: CARDIOLOGY | Facility: HOSPITAL | Age: 53
Discharge: HOME | End: 2023-12-07
Payer: MEDICARE

## 2023-12-07 DIAGNOSIS — R00.2 PALPITATIONS: ICD-10-CM

## 2023-12-07 DIAGNOSIS — Z12.31 SCREENING MAMMOGRAM FOR BREAST CANCER: ICD-10-CM

## 2023-12-07 DIAGNOSIS — E55.9 VITAMIN D INSUFFICIENCY: ICD-10-CM

## 2023-12-07 DIAGNOSIS — E78.5 BORDERLINE HYPERLIPIDEMIA: ICD-10-CM

## 2023-12-07 DIAGNOSIS — R73.03 PRE-DIABETES: ICD-10-CM

## 2023-12-07 DIAGNOSIS — J44.9 CHRONIC OBSTRUCTIVE PULMONARY DISEASE, UNSPECIFIED COPD TYPE (MULTI): ICD-10-CM

## 2023-12-07 DIAGNOSIS — Z79.899 CURRENT USE OF PROTON PUMP INHIBITOR: ICD-10-CM

## 2023-12-07 DIAGNOSIS — E83.42 HYPOMAGNESEMIA: ICD-10-CM

## 2023-12-07 DIAGNOSIS — R09.02 HYPOXIA: Primary | ICD-10-CM

## 2023-12-07 DIAGNOSIS — R07.89 OTHER CHEST PAIN: ICD-10-CM

## 2023-12-07 LAB
25(OH)D3 SERPL-MCNC: 22 NG/ML (ref 30–100)
ALBUMIN SERPL BCP-MCNC: 3.9 G/DL (ref 3.4–5)
ALP SERPL-CCNC: 92 U/L (ref 33–110)
ALT SERPL W P-5'-P-CCNC: 28 U/L (ref 7–45)
ANION GAP SERPL CALC-SCNC: 12 MMOL/L (ref 10–20)
AST SERPL W P-5'-P-CCNC: 15 U/L (ref 9–39)
BILIRUB SERPL-MCNC: 0.4 MG/DL (ref 0–1.2)
BUN SERPL-MCNC: 16 MG/DL (ref 6–23)
CALCIUM SERPL-MCNC: 9.1 MG/DL (ref 8.6–10.3)
CHLORIDE SERPL-SCNC: 104 MMOL/L (ref 98–107)
CHOLEST SERPL-MCNC: 209 MG/DL (ref 0–199)
CHOLESTEROL/HDL RATIO: 4
CO2 SERPL-SCNC: 29 MMOL/L (ref 21–32)
CREAT SERPL-MCNC: 0.76 MG/DL (ref 0.5–1.05)
ERYTHROCYTE [DISTWIDTH] IN BLOOD BY AUTOMATED COUNT: 13.8 % (ref 11.5–14.5)
EST. AVERAGE GLUCOSE BLD GHB EST-MCNC: 128 MG/DL
GFR SERPL CREATININE-BSD FRML MDRD: >90 ML/MIN/1.73M*2
GLUCOSE SERPL-MCNC: 85 MG/DL (ref 74–99)
HBA1C MFR BLD: 6.1 %
HCT VFR BLD AUTO: 43.7 % (ref 36–46)
HDLC SERPL-MCNC: 52.4 MG/DL
HGB BLD-MCNC: 13.4 G/DL (ref 12–16)
LDLC SERPL CALC-MCNC: 126 MG/DL
MAGNESIUM SERPL-MCNC: 2.05 MG/DL (ref 1.6–2.4)
MCH RBC QN AUTO: 29.7 PG (ref 26–34)
MCHC RBC AUTO-ENTMCNC: 30.7 G/DL (ref 32–36)
MCV RBC AUTO: 97 FL (ref 80–100)
NON HDL CHOLESTEROL: 157 MG/DL (ref 0–149)
NRBC BLD-RTO: 0 /100 WBCS (ref 0–0)
PLATELET # BLD AUTO: 403 X10*3/UL (ref 150–450)
POTASSIUM SERPL-SCNC: 4.2 MMOL/L (ref 3.5–5.3)
PROT SERPL-MCNC: 6.6 G/DL (ref 6.4–8.2)
RBC # BLD AUTO: 4.51 X10*6/UL (ref 4–5.2)
SODIUM SERPL-SCNC: 141 MMOL/L (ref 136–145)
T4 FREE SERPL-MCNC: 0.98 NG/DL (ref 0.61–1.12)
TRIGL SERPL-MCNC: 152 MG/DL (ref 0–149)
TSH SERPL-ACNC: 5.05 MIU/L (ref 0.44–3.98)
VLDL: 30 MG/DL (ref 0–40)
WBC # BLD AUTO: 13.8 X10*3/UL (ref 4.4–11.3)

## 2023-12-07 PROCEDURE — 77063 BREAST TOMOSYNTHESIS BI: CPT

## 2023-12-07 PROCEDURE — 82306 VITAMIN D 25 HYDROXY: CPT

## 2023-12-07 PROCEDURE — 77067 SCR MAMMO BI INCL CAD: CPT | Performed by: RADIOLOGY

## 2023-12-07 PROCEDURE — 86317 IMMUNOASSAY INFECTIOUS AGENT: CPT

## 2023-12-07 PROCEDURE — 85027 COMPLETE CBC AUTOMATED: CPT

## 2023-12-07 PROCEDURE — 84443 ASSAY THYROID STIM HORMONE: CPT

## 2023-12-07 PROCEDURE — 77067 SCR MAMMO BI INCL CAD: CPT

## 2023-12-07 PROCEDURE — 84439 ASSAY OF FREE THYROXINE: CPT

## 2023-12-07 PROCEDURE — 77063 BREAST TOMOSYNTHESIS BI: CPT | Performed by: RADIOLOGY

## 2023-12-07 PROCEDURE — 83735 ASSAY OF MAGNESIUM: CPT

## 2023-12-07 PROCEDURE — 83036 HEMOGLOBIN GLYCOSYLATED A1C: CPT

## 2023-12-07 PROCEDURE — 36415 COLL VENOUS BLD VENIPUNCTURE: CPT

## 2023-12-07 PROCEDURE — 93246 EXT ECG>7D<15D RECORDING: CPT

## 2023-12-07 PROCEDURE — 80061 LIPID PANEL: CPT

## 2023-12-07 PROCEDURE — 93248 EXT ECG>7D<15D REV&INTERPJ: CPT | Performed by: INTERNAL MEDICINE

## 2023-12-07 PROCEDURE — 80053 COMPREHEN METABOLIC PANEL: CPT

## 2023-12-08 DIAGNOSIS — E78.2 MIXED HYPERLIPIDEMIA: Primary | ICD-10-CM

## 2023-12-08 DIAGNOSIS — E55.9 VITAMIN D DEFICIENCY: ICD-10-CM

## 2023-12-08 RX ORDER — PRAVASTATIN SODIUM 10 MG/1
10 TABLET ORAL NIGHTLY
COMMUNITY
Start: 2022-11-16 | End: 2023-12-08

## 2023-12-08 RX ORDER — ASPIRIN 325 MG
50000 TABLET, DELAYED RELEASE (ENTERIC COATED) ORAL
Qty: 6 CAPSULE | Refills: 0 | Status: SHIPPED | OUTPATIENT
Start: 2023-12-08 | End: 2024-01-13

## 2023-12-08 RX ORDER — PRAVASTATIN SODIUM 40 MG/1
40 TABLET ORAL NIGHTLY
Qty: 90 TABLET | Refills: 3 | Status: SHIPPED | OUTPATIENT
Start: 2023-12-08 | End: 2024-01-10 | Stop reason: ALTCHOICE

## 2023-12-11 LAB
S PN DA SERO 19F IGG SER-MCNC: 2.02 UG/ML
S PNEUM DA 1 IGG SER-MCNC: 2.29 UG/ML
S PNEUM DA 12F IGG SER-MCNC: 0.2 UG/ML
S PNEUM DA 14 IGG SER-MCNC: 2.45 UG/ML
S PNEUM DA 18C IGG SER-MCNC: >11.15 UG/ML
S PNEUM DA 23F IGG SER-MCNC: 0.13 UG/ML
S PNEUM DA 3 IGG SER-MCNC: 0.93 UG/ML
S PNEUM DA 4 IGG SER-MCNC: 2.68 UG/ML
S PNEUM DA 5 IGG SER-MCNC: 1.16 UG/ML
S PNEUM DA 6B IGG SER-MCNC: 1.75 UG/ML
S PNEUM DA 7F IGG SER-MCNC: 4.63 UG/ML
S PNEUM DA 8 IGG SER-MCNC: 0.24 UG/ML
S PNEUM DA 9N IGG SER-MCNC: 1.72 UG/ML
S PNEUM DA 9V IGG SER-MCNC: 4.13 UG/ML
S PNEUM SEROTYPE IGG SER-IMP: NORMAL

## 2024-01-10 ENCOUNTER — OFFICE VISIT (OUTPATIENT)
Dept: PULMONOLOGY | Facility: CLINIC | Age: 54
End: 2024-01-10
Payer: MEDICARE

## 2024-01-10 VITALS
HEART RATE: 77 BPM | DIASTOLIC BLOOD PRESSURE: 83 MMHG | BODY MASS INDEX: 39.75 KG/M2 | WEIGHT: 253.8 LBS | OXYGEN SATURATION: 95 % | SYSTOLIC BLOOD PRESSURE: 120 MMHG

## 2024-01-10 DIAGNOSIS — B33.8 RSV (RESPIRATORY SYNCYTIAL VIRUS INFECTION): ICD-10-CM

## 2024-01-10 DIAGNOSIS — R91.8 LUNG NODULES: ICD-10-CM

## 2024-01-10 DIAGNOSIS — Z87.891 PERSONAL HISTORY OF NICOTINE DEPENDENCE: ICD-10-CM

## 2024-01-10 DIAGNOSIS — Z14.8 ALPHA-1-ANTITRYPSIN DEFICIENCY CARRIER: ICD-10-CM

## 2024-01-10 DIAGNOSIS — J43.9 PULMONARY EMPHYSEMA, UNSPECIFIED EMPHYSEMA TYPE (MULTI): Primary | ICD-10-CM

## 2024-01-10 DIAGNOSIS — J43.2 CENTRILOBULAR EMPHYSEMA (MULTI): ICD-10-CM

## 2024-01-10 PROCEDURE — 3008F BODY MASS INDEX DOCD: CPT | Performed by: NURSE PRACTITIONER

## 2024-01-10 PROCEDURE — 99215 OFFICE O/P EST HI 40 MIN: CPT | Performed by: NURSE PRACTITIONER

## 2024-01-10 RX ORDER — BUDESONIDE AND FORMOTEROL FUMARATE DIHYDRATE 160; 4.5 UG/1; UG/1
2 AEROSOL RESPIRATORY (INHALATION) 2 TIMES DAILY
Qty: 30 G | Refills: 2 | Status: SHIPPED | OUTPATIENT
Start: 2024-01-10

## 2024-01-10 RX ORDER — LEVOFLOXACIN 500 MG/1
500 TABLET, FILM COATED ORAL
Qty: 10 TABLET | Refills: 0 | Status: SHIPPED | OUTPATIENT
Start: 2024-01-10 | End: 2024-01-20

## 2024-01-10 RX ORDER — IPRATROPIUM BROMIDE AND ALBUTEROL 20; 100 UG/1; UG/1
1 SPRAY, METERED RESPIRATORY (INHALATION) 4 TIMES DAILY PRN
Qty: 8 G | Refills: 11 | Status: SHIPPED | OUTPATIENT
Start: 2024-01-10

## 2024-01-10 RX ORDER — PREDNISONE 10 MG/1
TABLET ORAL
Qty: 30 TABLET | Refills: 3 | Status: SHIPPED | OUTPATIENT
Start: 2024-01-10 | End: 2024-02-09

## 2024-01-10 ASSESSMENT — ENCOUNTER SYMPTOMS: SHORTNESS OF BREATH: 1

## 2024-01-10 NOTE — PATIENT INSTRUCTIONS
Ms. Trinity it was a pleasure seeing you in the office today. We discussed the following:     - Please continue your Symbicort and Combivent   - I have ordered you a breathing and walking test   - I also ordered you a chest CT to be done in July    Follow-up in 6 months with Dr. Vallejo with repeat breathing test, walking test and chest CT

## 2024-01-10 NOTE — PROGRESS NOTES
Subjective   Patient ID: Tanisha Petersen is a 53 y.o. female who presents for No chief complaint on file..  HPI  10/3/19: 49-year-old  woman with very severe COPD (FEV1 29% predicted, 9/26/17), alpha one antitrypsin carrier (MZ), and with 6 mm right lower lobe and a 4 mm right lower lobe lung nodules presents for routine follow-up. The patient stopped taking Tudorza 2 months ago because she did not see any benefit from it. However, she has been more short of breath since then and has been wheezing daily with occasional cough productive of white sputum. No hospitalizations or ER visits for COPD exacerbation in 6 months. She has been taking Combivent 8 times a day. She needs a new nebulizer. She does not have medications for her nebulizer. She is trying to quit smoking and has cut down with Chantix to 1.5-2 cigarettes per day. Denies weight loss, anorexia, hemoptysis or chest pain.    01/22/2020: Since her last visit she has quit smoking. She had a 6 minute walk which did show that she now requires oxygen with exertion and she also had a new pulmonary function test. She was tried on Trelegy but did not care for it and is now back on Symbicort and Tudorza. She does use Combivent for rescue as albuterol has never worked for her. She has noticed more shortness of breath over the last 6 months, especially when she is active. She did not bring her oxygen with her today she does not want to carry the tanks. I will try to get her a by mouth C but it will depend on whether or not insurance will cover it. I did explain to her she can purchase them if she is able. She needs a nocturnal pulse oximetry study as she has not been using oxygen at night and was unsure if she needed to     04/14/2020: Since the last visit, patient's breathing is mostly unchanged. No new chest pain, cough, sputum, fever, chills or night sweats. Had PFTs done (results below). She started smoking a few cigarettes again with her mother's recent  diagnosis with cancer and the COVID crisis.    08/11/2020: Since the last visit, patient's breathing has been improving. CAT 24. Stopped her prednisone. Continues to smoke. interested in quitting. Patient had repeat PFTs, 6MWT done (results below).     12/15/2020: Since the last visit, patient's breathing has been improving. She saw Dr. Landrum from allergy and was started on Nucala. She has noticed great improvement in her breathing. Continues to abstain from smoking.     04/13/2021: Since the last visit, patient's breathing is mostly unchanged. No new chest pain, cough, sputum, fever, chills or night sweats. Breathing worsened when she was on vacation. Smoked a few cigarettes. Attributes the worsening of her breathing to carbohydrates in her diet. Patient had repeat CT and PFTs done (results below).     11/30/2021: Since the last visit, patient's breathing has been worsening. She attributes the worsening to the change in weather. Has been wheezing more often and occasionally taking 10mg of prednisone. Continues to abstain from smoking. Had repeat lul done (results below).     05/24/2022: Since the last visit, patient's breathing is mostly unchanged. No new chest pain, cough, sputum, fever, chills or night sweats. Has put some 15pounds since she stopped smoking. Compliant with her inhalers. She was switched from Nucala to Fosenra with some sx improvement. Patient had repeat breathing test, 6MWT, done (results below).     01/10/2023: Since the last visit, patient's breathing has been improving. No new chest pain, cough, sputum, fever, chills or night sweats. Participating in pulmonary rehab. Compliant with her inhalers. Uses symbicort twice daily and prn combivent 3-4xday. No night symptoms. Got CT cardiac scoring in 12/2022 with 7-8 pleural based nodules.     07/25/2023: She is having allergic reaction to Fasenra. Since the last visit, patient's breathing is mostly unchanged. No new chest pain, cough, sputum,  fever, chills or night sweats. Just got off a month long prednisone course. Patient had repeat breathing test, 6MWT, CT scan done (results below).     1/10/24 she is here today for follow-up.  Patient unfortunately got RSV in November.  She was treated with doxycycline and prednisone from her PCP.  Patient is still not at baseline.  She has not been able to go down below 10 mg of prednisone as she is still short of breath.  She is also still coughing and wheezing.  She continues to use Symbicort but it is no longer covered by insurance we will try the generic.  She needs her Combivent refilled today also    Previous pulmonary history:   She has no history of recurrent infections, or lung disease as a child. She was previously told she has COPD . She currently is on supplemental oxygen. She has never been to pulmonary rehab. Does recall having AECOPD requiring antibiotics or prednisone.     Inhalers/nebulized medications: Symbicort, Tudorza, Combivent     Hospitalization History:  She has not been hospitalized over the last year for breathing related problem.     Sleep history:  Denies snoring, apneas, feeling tired during the day or taking naps during the day. Admits to feeling tired during the day.     Comorbidities:   Alpha 1 carrier    SH:  smoking: former smoker  drinking: none  illicit drug use: none     Occupation: (Full questionnaire on exposures obtained, discussed with the patient and scanned to EMR)  No known exposure to asbestos, silica or beryllium     Family History:  No family history of lung diseases or cancer     Imaging history: (I have personally reviewed the imaging below)  07/21/2023 Stable nodule  Got CT cardiac scoring in 12/2022 with 7-8 pleural based nodules.   03/2021 -> CT with stable nodules  03/30/2020 -> CT mostly unchanged   4/19: Chest Ct - small subcentimeter nodules    PFTs:  06/27/2023 -> Ratio of 0.41/FEV1 1.27L (41%)(no BD response)/FVC 3.08L (79%)/TLC 98%/RVtoTLC ratio 0.51/DLCO  54%  05/23/2022: -> Ratio of 0.41/FEV1 1.34L (43%)(no BD response)/FVC 3.27L (83%)  11/30/2021 -> Ratio of 0.37/FEV1 1.16L (37%)(no BD response)/FVC 3.11L (79%)/DLCO 57%   03/22/2021-> Ratio of 0.38/FEV1 1.12L (36%) (+ BD response)/FVC 2.93L (74%)/DLCO 50->60%  07/08/2020 -> Ratio of 0.35/FEV1 1.24L (39%) (+ BD response)/FVC 3.53L (89%)/%/RVtoTLC ratio 0.45/DLCO 48%   11/12/19 // -> FEV1/FVC ratio .35 /FEV1 28% (+ BD response)/FVC 65% /DLCO 33% /TLC/RV to TLC ratio .66     6 MWTs:   06/24/2023->on 429 m. Peak SpO2 of 98%. Lefty SpO2 93%.   05/23/2022 ->on RA, 271m. Peak SpO2 of 99%. Lefty SpO2 93%.   07/08/2020 ->on RA, 322 m without desaturation. Lefty SpO2 of 96%.   01/08/2020: she walked 222 m and her 02 went to 87% she requires oxygen with exertion     Lung biopsy: None on record     Echo:   07/15/2020 -> Normal EF, diastolic dysfunction, with normal LA, RV size and function     Labs:   IGE of 1200  Review of Systems   Respiratory:  Positive for shortness of breath.    All other systems reviewed and are negative.      Objective   Physical Exam  Vitals and nursing note reviewed.   Constitutional:       Appearance: Normal appearance. She is obese.   HENT:      Head: Normocephalic.      Nose: Nose normal.   Eyes:      Pupils: Pupils are equal, round, and reactive to light.   Cardiovascular:      Rate and Rhythm: Normal rate.   Pulmonary:      Effort: Pulmonary effort is normal.      Breath sounds: Normal breath sounds.   Neurological:      General: No focal deficit present.      Mental Status: She is alert and oriented to person, place, and time. Mental status is at baseline.   Psychiatric:         Mood and Affect: Mood normal.         Behavior: Behavior normal.         Thought Content: Thought content normal.         Assessment/Plan     # Asthma COPD overlap   - FEV1 35%    - Continue Symbicort and add Spiriva (she did not like Trelegy). Continue with Combivent one puff up to every 4 hours as needed for  acute shortness of breath or Duonebs by nebulizer only as needed for shortness of breath   - She is waiting to start pulmonary rehab.   - No emphysema on CT and sx suggest asthma-copd overlap. IGE of 1200. CT without sx of ABPA  -referral made to allergy clinic with Dr. Landrum and started on Nucala, now on Fosenra  -discussed in length the role of lung transplantation down the line but she will need to completely abstain from smoking, enrol in pulmonary rehab and lose weight  1/10/24 she is on Combivent and Symbicort, She is on Tespire now through her immunologist. Symbicort is no longer covered. We will try the generic     # On chronic prednisone:  -10mg daily. will work on weaning off. very resistant  -added Ca and Vitamin D  -stopped taking prednisone in 06/2020. Now only uses intermittently  1/10/24 since she had RSV she is using 10 mg of prednisone daily     # Hypoxia -RESOLVED   - she needed oxygen with exertion and sleep  -did not need oxygen with ambulation on last walk test    #. Lung nodules:  - 6 mm and 4 mm right lower lobe lung nodules in a patient with history of and current heavy smoking, therefore high risk for malignancy   - Repeat chest CT in April 2020, one year from previous chest CT for continued surveillance. Stable  -repeat CT in April 2021. stable.  -no further surveillance needed  Got CT cardiac scoring in 12/2022 with 7-8 pleural based nodules. Repeat CT in 6-9 months stabel  -repeat CT in 1 year  1/10/24 she had a chest CT from 7/23. Stable nodules dating back to at least 3/21. Will repeat in 1 year     # RSV   - Had RSV in November    - saw her PCP, was treated with Doxyc and Prednisone   - still coughing and using prednisone but is not at baseline    # Alpha-1 antitrypsin deficiency carrier   - rechecked given degree of obstruction. level of 100    #. Tobacco abuse,    - She quit smoking in 10/2019 but relapsed with a few cigarettes a few weeks ago  -recounseled for 6 minutes  -smoke  free on Chantix  1/10/24 she is not smoking    Ms. Petersen it was a pleasure seeing you in the office today. We discussed the following:     - Please continue your Symbicort and Combivent   - I have ordered you a breathing and walking test   - I also ordered you a chest CT to be done in July    Follow-up in 6 months with Dr. Vallejo with repeat breathing test, walking test and chest CT          GAVINO Velazquez-CNP 01/10/24 8:28 AM

## 2024-01-29 ENCOUNTER — APPOINTMENT (OUTPATIENT)
Dept: CARDIOLOGY | Facility: CLINIC | Age: 54
End: 2024-01-29
Payer: MEDICARE

## 2024-02-13 ENCOUNTER — TELEMEDICINE (OUTPATIENT)
Dept: CARDIOLOGY | Facility: CLINIC | Age: 54
End: 2024-02-13
Payer: MEDICARE

## 2024-02-13 DIAGNOSIS — J43.9 PULMONARY EMPHYSEMA, UNSPECIFIED EMPHYSEMA TYPE (MULTI): ICD-10-CM

## 2024-02-13 DIAGNOSIS — R00.2 PALPITATIONS: ICD-10-CM

## 2024-02-13 DIAGNOSIS — F17.210 CIGARETTE NICOTINE DEPENDENCE WITHOUT COMPLICATION: Primary | ICD-10-CM

## 2024-02-13 PROCEDURE — 3008F BODY MASS INDEX DOCD: CPT | Performed by: NURSE PRACTITIONER

## 2024-02-13 PROCEDURE — 99442 PR PHYS/QHP TELEPHONE EVALUATION 11-20 MIN: CPT | Performed by: NURSE PRACTITIONER

## 2024-02-13 RX ORDER — ITRACONAZOLE 100 MG/1
100 CAPSULE ORAL DAILY
COMMUNITY
Start: 2024-01-31 | End: 2024-05-30 | Stop reason: WASHOUT

## 2024-02-13 NOTE — PROGRESS NOTES
Primary Care Physician: Jennyfer Levine PA-C  Primary Cardiologist:       Date of Visit: 02/13/2024  2:20 PM EST  Location of visit:  [facility]   Type of Visit: Follow up        No chief complaint on file.      HPI / Summary:   Tanisha Petersen is a 53 y.o. female  with preserved LV systolic function, T2DM not requiring insulin, COPD and obesity   Telephone visit to follow up test results     Extended Holter unremarkable for significant symptomatic arrhythmia   She generally feels with no sustained palpitations     12 system review is negative except as noted above         Medical History:   Past Medical History:   Diagnosis Date    Hematuria 05/16/2023    Oral thrush 05/16/2023    Personal history of nicotine dependence 11/18/2022    History of tobacco abuse       Social History:   Tobacco Use: Medium Risk (1/10/2024)    Patient History     Smoking Tobacco Use: Former     Smokeless Tobacco Use: Never     Passive Exposure: Not on file         MEDICATIONS:   Current Outpatient Medications   Medication Instructions    albuterol 90 mcg/actuation inhaler 2 puffs, inhalation, Every 4 hours PRN    budesonide-formoteroL (Symbicort) 160-4.5 mcg/actuation inhaler 2 puffs, inhalation, 2 times daily, Rinse mouth with water after use to reduce aftertaste and incidence of candidiasis. Do not swallow.    famotidine (PEPCID) 40 mg, oral, Nightly    ipratropium-albuteroL (Combivent Respimat)  mcg/actuation inhaler 1 puff, inhalation, 4 times daily PRN    itraconazole (SPORANOX) 100 mg, oral, Daily    loratadine (CLARITIN) 10 mg, oral, Daily    montelukast (SINGULAIR) 10 mg, oral, Nightly    nebulizers misc 1 Units, Not Applicable, Once, Patient to use nebulizer machine with DuoNeb every 4 hours as needed for SOB    pantoprazole (PROTONIX) 40 mg, oral, Daily before breakfast    predniSONE (Deltasone) 20 mg tablet Take 3 tabs (60mg) daily for 3 days, then take 2 tabs (40mg) daily for 3 days, then take 1 tab (20mg) daily for  3 days.         IMAGING REVIEWED:   Echocardiogram:   Echocardiogram     Narrative  [facility], [address] [address]  Tel [phone] and Fax [phone]    TRANSTHORACIC ECHOCARDIOGRAM REPORT      Patient Name:     ANDRA REBOLLAR Reading Physician:   [id number] Rizwan Medrano MD  Study Date:       9/20/2023       Referring Physician: MARIELOS LEVINE  MRN/PID:          [id number]        PCP:  Accession/Order#: [id number]       Department Location: [facility]  YOB: 1970       Fellow:  Gender:           F               Nurse:  Admit Date:                       Sonographer:         Renee Wahl RD  Admission Status: Outpatient      Additional Staff:  Height:           170.18 cm       CC Report to:        Marielos Levine CNP  Weight:           107.50 kg       Study Type:          Echocardiogram  BSA:              2.17 m2  Blood Pressure: 131 /91 mmHg    Diagnosis/ICD: R00.2-Palpitations  Indication:    palpitations  Procedure/CPT: Echo Complete w Full [id number]    Patient History:  Pertinent History: COPD and Hyperlipidemia. SOB, hx trivial pericardial  effusion.    Study Detail: The following Echo studies were performed: 2D, M-Mode, Doppler and  color flow. Technically challenging study due to respiratory  interference and frequent coughing. The patient was awake.      PHYSICIAN INTERPRETATION:  Left Ventricle: The left ventricular systolic function is normal, with an estimated ejection fraction of 60-65%. There are no regional wall motion abnormalities. The left ventricular cavity size is normal. Spectral Doppler shows a normal pattern of left ventricular diastolic filling.  Left Atrium: The left atrium is normal in size.  Right Ventricle: The right ventricle is normal in size. There is normal right ventricular global systolic function.  Right Atrium: The right atrium is normal in size.  Aortic Valve: The aortic valve is trileaflet. There is no evidence of aortic  valve stenosis.  There is no evidence of aortic valve regurgitation. The peak instantaneous gradient of the aortic valve is 4.8 mmHg.  Mitral Valve: The mitral valve is normal in structure. There is trace mitral valve regurgitation.  Tricuspid Valve: The tricuspid valve is structurally normal. There is trace tricuspid regurgitation. The right ventricular systolic pressure is unable to be estimated.  Pulmonic Valve: The pulmonic valve is not well visualized. There is physiologic pulmonic valve regurgitation.  Pericardium: There is a trivial pericardial effusion. There is no evidence of constrictive pericarditis. There is no evidence of cardiac tamponade.  Aorta: The aortic root is normal.  Systemic Veins: The inferior vena cava appears to be of normal size. There is IVC inspiratory collapse greater than 50%.  In comparison to the previous echocardiogram(s): Compared with study from 10/7/2022,.      CONCLUSIONS:  1. Left ventricular systolic function is normal with a 60-65% estimated ejection fraction.         [id number] Rizwan Medrano MD  Electronically signed on 9/20/2023 at 12:14:51 PM         Cardiac Scoring:   CT heart calcium scoring wo IV contrast 12/03/2022    Narrative  MRN: [id number]  Patient Name: ANDRA REBOLLAR    STUDY:  CT CARDIAC SCORING;  12/3/2022 2:56 pm    INDICATION:  none. [facility]  J44.9: COPD (chronic obstructive pulmonary disease)  Z87.891: History of tobacco abuse E66.9: Class 1 obesity with body  mass index (BMI) of 34.0 to 34.9 in adult.    COMPARISON:  None.    ACCESSION NUMBER(S):  [id number]    ORDERING CLINICIAN:  MARIELOS HARRIS    TECHNIQUE:  Using prospective ECG gating, limited CT scan of the coronary  arteries was performed without intravenous contrast. Coronary calcium  scoring  was performed according to the method of Agatston.    FINDINGS:  The score and distribution of calcium in the coronary arteries is as  follows:    LM: 0.4.  LAD: 0.  LCx: 0.  RCA: 0.    Total: 0.4.    The visualized  segments of the lungs are normally expanded. 7-8 mm  pleural-based nodular density posteriorly on the right image 32.  Bibasilar scarring/atelectasis. Focal consolidation/atelectasis  middle lobe and lingula.    The visualized mid/lower ascending thoracic aorta measures 3.2 cm in  diameter.  There is some calcifications visualized thoracic aorta.    The heart is normal in size. Small pericardial effusion.    No gross evidence of mediastinal or hilar lymphadenopathy is  identified.    Small hiatal hernia. Fatty liver.    Impression  1. Coronary artery calcium score of 0.4 *.  2. 7-8 mm pleural-based nodular density posteriorly on the right for  which follow-up recommended. Consider follow up non contrast chest CT  at 6-12 months, then consider CT chest at 18-24 months (Scottie Issa et al., Guidelines for management of incidental pulmonary  nodules detected on CT images: From the Fleischner Society 2017,  Radiology. 2017 Jul;284 (1):228-243.) [email or web address]  3. Focal consolidation/atelectasis middle lobe and lingula.  4. Additional findings as above.          LABS:  CBC with Differential:    Lab Results   Component Value Date    WBC 13.8 (H) 12/07/2023    RBC 4.51 12/07/2023    HGB 13.4 12/07/2023    HCT 43.7 12/07/2023     12/07/2023    MCV 97 12/07/2023    MCH 29.7 12/07/2023    MCHC 30.7 (L) 12/07/2023    RDW 13.8 12/07/2023    NRBC 0.0 12/07/2023    LYMPHOPCT 29.2 08/22/2023    MONOPCT 8.6 08/22/2023    EOSPCT 1.5 08/31/2020    BASOPCT 0.2 08/22/2023    MONOSABS 0.91 08/22/2023    LYMPHSABS 3.08 08/22/2023    EOSABS 0.18 08/31/2020    BASOSABS 0.02 08/22/2023     CMP:    Lab Results   Component Value Date     12/07/2023    K 4.2 12/07/2023     12/07/2023    CO2 29 12/07/2023    BUN 16 12/07/2023    CREATININE 0.76 12/07/2023    GLUCOSE 85 12/07/2023    PROT 6.6 12/07/2023    CALCIUM 9.1 12/07/2023    BILITOT 0.4 12/07/2023    ALKPHOS 92 12/07/2023    AST 15 12/07/2023    ALT 28  12/07/2023     BMP:    Lab Results   Component Value Date     12/07/2023    K 4.2 12/07/2023     12/07/2023    CO2 29 12/07/2023    BUN 16 12/07/2023    CREATININE 0.76 12/07/2023    CALCIUM 9.1 12/07/2023    GLUCOSE 85 12/07/2023     Magnesium:  Lab Results   Component Value Date    MG 2.05 12/07/2023     Troponin:    Lab Results   Component Value Date    TROPHS CANCELED 08/23/2023    TROPHS 3 08/22/2023    TROPHS 3 08/22/2023     BNP:   Lab Results   Component Value Date    BNP 45 10/06/2022         Lipid Panel:  Lab Results   Component Value Date    HDL 52.4 12/07/2023    CHHDL 4.0 12/07/2023    VLDL 30 12/07/2023    TRIG 152 (H) 12/07/2023    NHDL 157 (H) 12/07/2023        Lab work and imaging results independently reviewed by me     Visit Vitals  OB Status Hysterectomy   Smoking Status Former                   Problem List Items Addressed This Visit             ICD-10-CM    Cigarette nicotine dependence without complication - Primary F17.210    COPD (chronic obstructive pulmonary disease) (CMS/[facility]) J44.9    Palpitations R00.2       Palpitations have essentially resolved and are no longer bothersome   Call cardiology as needed         02/16/24 at 2:25 PM - GAVINO Chacko-CNP      Orders:  No orders of the defined types were placed in this encounter.        Followup Appts:  Future Appointments   Date Time Provider Department Center   2/28/2024  2:30 PM Jennyfer Levine PA-C [id number] [facility]   4/22/2024 11:40 AM Elham Adan MD [id number] [facility]   8/13/2024  1:00 PM Karina Vallejo MD [id number] [facility]

## 2024-02-16 PROBLEM — R00.2 PALPITATIONS: Status: ACTIVE | Noted: 2024-02-16

## 2024-02-28 ENCOUNTER — OFFICE VISIT (OUTPATIENT)
Dept: PRIMARY CARE | Facility: CLINIC | Age: 54
End: 2024-02-28
Payer: MEDICARE

## 2024-02-28 VITALS
WEIGHT: 242 LBS | DIASTOLIC BLOOD PRESSURE: 89 MMHG | SYSTOLIC BLOOD PRESSURE: 119 MMHG | HEIGHT: 67 IN | HEART RATE: 81 BPM | BODY MASS INDEX: 37.98 KG/M2 | OXYGEN SATURATION: 94 % | TEMPERATURE: 97 F

## 2024-02-28 DIAGNOSIS — Z29.11 NEED FOR RSV IMMUNIZATION: ICD-10-CM

## 2024-02-28 DIAGNOSIS — R63.4 RAPID WEIGHT LOSS: Primary | ICD-10-CM

## 2024-02-28 PROCEDURE — 99213 OFFICE O/P EST LOW 20 MIN: CPT | Performed by: PHYSICIAN ASSISTANT

## 2024-02-28 PROCEDURE — 3008F BODY MASS INDEX DOCD: CPT | Performed by: PHYSICIAN ASSISTANT

## 2024-02-28 RX ORDER — RESPIRATORY SYNCYTIAL VISUS VACCINE RECOMBINANT, ADJUVANTED 120MCG/0.5
0.5 KIT INTRAMUSCULAR ONCE
Qty: 0.5 ML | Refills: 0 | Status: SHIPPED | OUTPATIENT
Start: 2024-02-28 | End: 2024-02-28

## 2024-02-28 ASSESSMENT — ENCOUNTER SYMPTOMS
LOSS OF SENSATION IN FEET: 0
DEPRESSION: 0
OCCASIONAL FEELINGS OF UNSTEADINESS: 0

## 2024-02-28 ASSESSMENT — PATIENT HEALTH QUESTIONNAIRE - PHQ9
1. LITTLE INTEREST OR PLEASURE IN DOING THINGS: NOT AT ALL
2. FEELING DOWN, DEPRESSED OR HOPELESS: NOT AT ALL
SUM OF ALL RESPONSES TO PHQ9 QUESTIONS 1 AND 2: 0

## 2024-02-28 NOTE — PROGRESS NOTES
Subjective     HPI   Tanisha Petersen is a 53 y.o. year old female patient with presenting to clinic with concern for   Chief Complaint   Patient presents with    Follow-up     3 debbie   No concerns       Quit smoking 2 months ago. Well done!    Has started compounded tirzepitide 5mg? from the internet less than 1 month ago and has lost 12 lbs. Poor appetite. Discussed realistic recommendations on weight loss and appropriate nutrition. Concern for muscle wasting in addition to unknown additives and She is interested in peptides from the internet soon without medical supervision.    B12 additive to her compounded tirzepatide IM currently.    Trying to be more active and going on vacation with lots of walking and being active.     GERD worsened since starting GLP-1. Reinforced appropriate diet.        Patient Active Problem List   Diagnosis    Abnormal mammogram of left breast    Alpha-1-antitrypsin deficiency carrier    Asthma    Hypoxia    Cigarette nicotine dependence without complication    COPD (chronic obstructive pulmonary disease) (CMS/HCC)    Lung nodules    RSV (respiratory syncytial virus infection)    Personal history of nicotine dependence    Palpitations       Past Medical History:   Diagnosis Date    Hematuria 2023    Oral thrush 2023    Personal history of nicotine dependence 2022    History of tobacco abuse      Past Surgical History:   Procedure Laterality Date    HYSTERECTOMY  2017    Hysterectomy    OTHER SURGICAL HISTORY  10/19/2022    Tubal ligation    OTHER SURGICAL HISTORY  10/19/2022     section      Family History   Problem Relation Name Age of Onset    No Known Problems Other        Social History     Tobacco Use    Smoking status: Former     Types: Cigarettes    Smokeless tobacco: Never    Tobacco comments:     Started smoking again after vacation with a friend. Needs help quitting.    Substance Use Topics    Alcohol use: Never        Current Outpatient  "Medications:     albuterol 90 mcg/actuation inhaler, Inhale 2 puffs every 4 hours if needed for wheezing or shortness of breath., Disp: , Rfl:     budesonide-formoteroL (Symbicort) 160-4.5 mcg/actuation inhaler, Inhale 2 puffs 2 times a day. Rinse mouth with water after use to reduce aftertaste and incidence of candidiasis. Do not swallow., Disp: 30 g, Rfl: 2    famotidine (Pepcid) 40 mg tablet, Take 1 tablet (40 mg) by mouth once daily at bedtime., Disp: 90 tablet, Rfl: 1    ipratropium-albuteroL (Combivent Respimat)  mcg/actuation inhaler, Inhale 1 puff 4 times a day as needed for wheezing (do not exceed 6 times daily)., Disp: 8 g, Rfl: 11    itraconazole (Sporanox) 100 mg capsule, Take 1 capsule (100 mg) by mouth once daily., Disp: , Rfl:     loratadine (Claritin) 10 mg tablet, Take 1 tablet (10 mg) by mouth once daily., Disp: 90 tablet, Rfl: 1    montelukast (Singulair) 10 mg tablet, Take 1 tablet (10 mg) by mouth once daily at bedtime., Disp: 90 tablet, Rfl: 1    nebulizers misc, 1 Units by Not Applicable route 1 time. Patient to use nebulizer machine with DuoNeb every 4 hours as needed for SOB, Disp: , Rfl:     pantoprazole (ProtoNix) 40 mg EC tablet, Take 1 tablet (40 mg) by mouth once daily in the morning. Take before meals., Disp: 90 tablet, Rfl: 1    respiratory syncytial virus, RSV, vaccine, adjuvanted, age 65y+ (Arexvy, PF,) 120 mcg/0.5 mL suspension for reconstitution, Inject 0.5 mL into the muscle 1 time for 1 dose., Disp: 0.5 mL, Rfl: 0     Review of Systems  Constitutional: Denies fever  HEENT: Denies ST, earache  CVS: Denies Chest pain  Pulmonary: Denies wheezing, SOB  GI: Denies N/V  : Denies dysuria  Musculoskeletal:  Denies myalgia  Neuro: Denies focal weakness or numbness.  Skin: Denies Rashes.  *Review of Systems is negative unless otherwise mentioned in HPI or ROS above.    Objective   /89   Pulse 81   Temp 36.1 °C (97 °F)   Ht 1.702 m (5' 7\")   Wt 110 kg (242 lb)   SpO2 94% "   BMI 37.90 kg/m²  reviewed Body mass index is 37.9 kg/m².     Physical Exam  Constitutional: NAD.  Resting comfortably.  Head: Atraumatic, normocephalic.  ENT: Moist oral mucosa. Nasal mucosa wnl.   Cardiac: Regular rate & rhythm.   Pulmonary: Lungs clear bilat  GI: Soft, Nontender, nondistended.   Musculoskeletal: No peripheral edema.   Skin: No evidence of trauma. No rashes  Psych: Intact judgement and insight.    .Assessment/Plan   Problem List Items Addressed This Visit    None  Visit Diagnoses         Codes    Need for RSV immunization    -  Primary Z29.11    Relevant Medications    respiratory syncytial virus, RSV, vaccine, adjuvanted, age 65y+ (Arexvy, PF,) 120 mcg/0.5 mL suspension for reconstitution

## 2024-04-22 ENCOUNTER — APPOINTMENT (OUTPATIENT)
Dept: CARDIOLOGY | Facility: CLINIC | Age: 54
End: 2024-04-22
Payer: MEDICARE

## 2024-05-22 DIAGNOSIS — K21.9 GASTROESOPHAGEAL REFLUX DISEASE WITHOUT ESOPHAGITIS: ICD-10-CM

## 2024-05-22 DIAGNOSIS — J45.40 MODERATE PERSISTENT ASTHMA WITHOUT COMPLICATION (HHS-HCC): ICD-10-CM

## 2024-05-22 RX ORDER — MONTELUKAST SODIUM 10 MG/1
10 TABLET ORAL NIGHTLY
Qty: 90 TABLET | Refills: 3 | Status: SHIPPED | OUTPATIENT
Start: 2024-05-22

## 2024-05-22 RX ORDER — PANTOPRAZOLE SODIUM 40 MG/1
40 TABLET, DELAYED RELEASE ORAL
Qty: 90 TABLET | Refills: 3 | Status: SHIPPED | OUTPATIENT
Start: 2024-05-22

## 2024-05-22 RX ORDER — LORATADINE 10 MG/1
10 TABLET ORAL DAILY
Qty: 90 TABLET | Refills: 0 | Status: SHIPPED | OUTPATIENT
Start: 2024-05-22

## 2024-05-29 NOTE — PROGRESS NOTES
"Subjective     HPI   Tanisha Petersen is a 53 y.o. year old female patient with presenting to clinic with concern for   Chief Complaint   Patient presents with    Follow-up     3 mth.        Tanisha presents to clinic for follow up care.    Quit smoking 2 months ago. Well done!    Now taking \"peptides\" planning to be tirzepitide from the internet. Now taking 6mg vs 5mg.   Has started compounded tirzepitide 5mg? from the internet. Poor appetite.   Has lost 32 lbs since .    Discussed realistic recommendations on weight loss and appropriate nutrition. Concern for muscle wasting in addition to unknown additives and She is interested in peptides from the internet soon without medical supervision.      B12 additive to her compounded tirzepatide IM currently.     Trying to be more active and going on vacation with lots of walking and being active.      GERD worsened since starting GLP-1. Reinforced appropriate diet. Encouraged proteins.      Vaginal odor. Fishy odor. Concern for BV.     Patient Active Problem List   Diagnosis    Abnormal mammogram of left breast    Alpha-1-antitrypsin deficiency carrier    Asthma (HHS-HCC)    Hypoxia    Cigarette nicotine dependence without complication    COPD (chronic obstructive pulmonary disease) (Multi)    Lung nodules    RSV (respiratory syncytial virus infection)    Personal history of nicotine dependence    Palpitations       Past Medical History:   Diagnosis Date    Hematuria 2023    Oral thrush 2023    Personal history of nicotine dependence 2022    History of tobacco abuse      Past Surgical History:   Procedure Laterality Date    HYSTERECTOMY  2017    Hysterectomy    OTHER SURGICAL HISTORY  10/19/2022    Tubal ligation    OTHER SURGICAL HISTORY  10/19/2022     section      Family History   Problem Relation Name Age of Onset    No Known Problems Other        Social History     Tobacco Use    Smoking status: Former     Average packs/day: 1 " pack/day for 39.1 years (39.1 ttl pk-yrs)     Types: Cigarettes     Start date: 1985    Smokeless tobacco: Never    Tobacco comments:     Started smoking again after vacation with a friend. Needs help quitting.    Substance Use Topics    Alcohol use: Never        Current Outpatient Medications:     albuterol 90 mcg/actuation inhaler, Inhale 2 puffs every 4 hours if needed for wheezing or shortness of breath., Disp: , Rfl:     budesonide-formoteroL (Symbicort) 160-4.5 mcg/actuation inhaler, Inhale 2 puffs 2 times a day. Rinse mouth with water after use to reduce aftertaste and incidence of candidiasis. Do not swallow., Disp: 30 g, Rfl: 2    famotidine (Pepcid) 40 mg tablet, Take 1 tablet (40 mg) by mouth once daily at bedtime., Disp: 90 tablet, Rfl: 1    ipratropium-albuteroL (Combivent Respimat)  mcg/actuation inhaler, Inhale 1 puff 4 times a day as needed for wheezing (do not exceed 6 times daily)., Disp: 8 g, Rfl: 11    itraconazole (Sporanox) 100 mg capsule, Take 1 capsule (100 mg) by mouth once daily., Disp: , Rfl:     loratadine (Claritin) 10 mg tablet, TAKE 1 TABLET BY MOUTH ONCE DAILY, Disp: 90 tablet, Rfl: 0    montelukast (Singulair) 10 mg tablet, Take 1 tablet (10 mg) by mouth once daily at bedtime., Disp: 90 tablet, Rfl: 3    nebulizers misc, 1 Units by Not Applicable route 1 time. Patient to use nebulizer machine with DuoNeb every 4 hours as needed for SOB, Disp: , Rfl:     pantoprazole (ProtoNix) 40 mg EC tablet, Take 1 tablet (40 mg) by mouth once daily in the morning. Take before meals., Disp: 90 tablet, Rfl: 3    metroNIDAZOLE (Flagyl) 500 mg tablet, Take 1 tablet (500 mg) by mouth 2 times a day for 7 days., Disp: 14 tablet, Rfl: 0     Review of Systems  Constitutional: Denies fever  HEENT: Denies ST, earache  CVS: Denies Chest pain  Pulmonary: Denies wheezing, SOB  GI: Denies N/V  : Denies dysuria  Musculoskeletal:  Denies myalgia  Neuro: Denies focal weakness or numbness.  Skin: Denies  "Rashes.  *Review of Systems is negative unless otherwise mentioned in HPI or ROS above.    Objective   /68   Temp 36.2 °C (97.2 °F)   Ht 1.702 m (5' 7\")   Wt 101 kg (222 lb 9.6 oz)   BMI 34.86 kg/m²  reviewed Body mass index is 34.86 kg/m².     Physical Exam  Constitutional: NAD.  Resting comfortably.  Head: Atraumatic, normocephalic.  ENT: Moist oral mucosa. Nasal mucosa wnl.   Cardiac: Regular rate & rhythm.   Pulmonary: Lungs clear bilat  GI: Soft, Nontender, nondistended.   Musculoskeletal: No peripheral edema.   Skin: No evidence of trauma. No rashes  Psych: Intact judgement and insight.  Declined pelvic exam    .Assessment/Plan   Problem List Items Addressed This Visit    None  Visit Diagnoses         Codes    Rapid weight loss    -  Primary R63.4    Relevant Orders    Vitamin B12    Quit smoking     Z87.891    Gastroesophageal reflux disease without esophagitis     K21.9    Bacterial vaginosis     N76.0, B96.89    Relevant Medications    metroNIDAZOLE (Flagyl) 500 mg tablet            "

## 2024-05-30 ENCOUNTER — OFFICE VISIT (OUTPATIENT)
Dept: PRIMARY CARE | Facility: CLINIC | Age: 54
End: 2024-05-30
Payer: MEDICARE

## 2024-05-30 VITALS
TEMPERATURE: 97.2 F | SYSTOLIC BLOOD PRESSURE: 126 MMHG | BODY MASS INDEX: 34.94 KG/M2 | HEIGHT: 67 IN | DIASTOLIC BLOOD PRESSURE: 68 MMHG | WEIGHT: 222.6 LBS

## 2024-05-30 DIAGNOSIS — N76.0 BACTERIAL VAGINOSIS: ICD-10-CM

## 2024-05-30 DIAGNOSIS — K21.9 GASTROESOPHAGEAL REFLUX DISEASE WITHOUT ESOPHAGITIS: ICD-10-CM

## 2024-05-30 DIAGNOSIS — B96.89 BACTERIAL VAGINOSIS: ICD-10-CM

## 2024-05-30 DIAGNOSIS — R63.4 RAPID WEIGHT LOSS: Primary | ICD-10-CM

## 2024-05-30 DIAGNOSIS — Z87.891 QUIT SMOKING: ICD-10-CM

## 2024-05-30 PROCEDURE — 99214 OFFICE O/P EST MOD 30 MIN: CPT | Performed by: PHYSICIAN ASSISTANT

## 2024-05-30 PROCEDURE — 3008F BODY MASS INDEX DOCD: CPT | Performed by: PHYSICIAN ASSISTANT

## 2024-05-30 RX ORDER — METRONIDAZOLE 500 MG/1
500 TABLET ORAL 2 TIMES DAILY
Qty: 14 TABLET | Refills: 0 | Status: SHIPPED | OUTPATIENT
Start: 2024-05-30 | End: 2024-06-06

## 2024-06-03 ENCOUNTER — LAB (OUTPATIENT)
Dept: LAB | Facility: LAB | Age: 54
End: 2024-06-03
Payer: MEDICARE

## 2024-06-03 DIAGNOSIS — R63.4 RAPID WEIGHT LOSS: ICD-10-CM

## 2024-06-03 PROCEDURE — 36415 COLL VENOUS BLD VENIPUNCTURE: CPT

## 2024-06-03 PROCEDURE — 82607 VITAMIN B-12: CPT

## 2024-06-04 LAB — VIT B12 SERPL-MCNC: >2000 PG/ML (ref 211–911)

## 2024-07-22 ENCOUNTER — HOSPITAL ENCOUNTER (OUTPATIENT)
Dept: RADIOLOGY | Facility: HOSPITAL | Age: 54
Discharge: HOME | End: 2024-07-22
Payer: MEDICARE

## 2024-07-22 ENCOUNTER — APPOINTMENT (OUTPATIENT)
Dept: RADIOLOGY | Facility: HOSPITAL | Age: 54
End: 2024-07-22
Payer: MEDICARE

## 2024-07-22 ENCOUNTER — APPOINTMENT (OUTPATIENT)
Dept: RESPIRATORY THERAPY | Facility: HOSPITAL | Age: 54
End: 2024-07-22
Payer: MEDICARE

## 2024-07-22 ENCOUNTER — HOSPITAL ENCOUNTER (OUTPATIENT)
Dept: RESPIRATORY THERAPY | Facility: HOSPITAL | Age: 54
Discharge: HOME | End: 2024-07-22
Payer: MEDICARE

## 2024-07-22 DIAGNOSIS — J43.2 CENTRILOBULAR EMPHYSEMA (MULTI): ICD-10-CM

## 2024-07-22 DIAGNOSIS — Z87.891 PERSONAL HISTORY OF NICOTINE DEPENDENCE: ICD-10-CM

## 2024-07-22 PROCEDURE — 71271 CT THORAX LUNG CANCER SCR C-: CPT

## 2024-07-22 PROCEDURE — 94729 DIFFUSING CAPACITY: CPT

## 2024-07-22 PROCEDURE — 94618 PULMONARY STRESS TESTING: CPT

## 2024-07-22 PROCEDURE — 94060 EVALUATION OF WHEEZING: CPT

## 2024-07-22 PROCEDURE — 94618 PULMONARY STRESS TESTING: CPT | Performed by: PEDIATRICS

## 2024-07-22 PROCEDURE — 94664 DEMO&/EVAL PT USE INHALER: CPT | Mod: 59

## 2024-07-22 PROCEDURE — 94060 EVALUATION OF WHEEZING: CPT | Performed by: PEDIATRICS

## 2024-07-22 PROCEDURE — 94729 DIFFUSING CAPACITY: CPT | Performed by: PEDIATRICS

## 2024-07-23 ENCOUNTER — APPOINTMENT (OUTPATIENT)
Dept: RADIOLOGY | Facility: HOSPITAL | Age: 54
End: 2024-07-23
Payer: MEDICARE

## 2024-07-23 LAB
MGC ASCENT PFT - FEV1 - POST: 1.39
MGC ASCENT PFT - FEV1 - PRE: 1.22
MGC ASCENT PFT - FEV1 - PREDICTED: 2.88
MGC ASCENT PFT - FVC - POST: 3.41
MGC ASCENT PFT - FVC - PRE: 3.17
MGC ASCENT PFT - FVC - PREDICTED: 3.59

## 2024-08-06 ENCOUNTER — APPOINTMENT (OUTPATIENT)
Dept: PULMONOLOGY | Facility: CLINIC | Age: 54
End: 2024-08-06
Payer: MEDICARE

## 2024-08-13 ENCOUNTER — APPOINTMENT (OUTPATIENT)
Dept: PULMONOLOGY | Facility: CLINIC | Age: 54
End: 2024-08-13
Payer: MEDICARE

## 2024-08-19 ENCOUNTER — APPOINTMENT (OUTPATIENT)
Dept: PULMONOLOGY | Facility: CLINIC | Age: 54
End: 2024-08-19
Payer: MEDICARE

## 2024-08-19 VITALS
OXYGEN SATURATION: 98 % | WEIGHT: 210.6 LBS | HEART RATE: 83 BPM | SYSTOLIC BLOOD PRESSURE: 133 MMHG | BODY MASS INDEX: 32.98 KG/M2 | DIASTOLIC BLOOD PRESSURE: 99 MMHG

## 2024-08-19 DIAGNOSIS — Z87.891 PERSONAL HISTORY OF NICOTINE DEPENDENCE: ICD-10-CM

## 2024-08-19 DIAGNOSIS — J43.2 CENTRILOBULAR EMPHYSEMA (MULTI): ICD-10-CM

## 2024-08-19 DIAGNOSIS — R91.8 LUNG NODULES: ICD-10-CM

## 2024-08-19 DIAGNOSIS — Z14.8 ALPHA-1-ANTITRYPSIN DEFICIENCY CARRIER: Primary | ICD-10-CM

## 2024-08-19 PROCEDURE — 99215 OFFICE O/P EST HI 40 MIN: CPT | Performed by: NURSE PRACTITIONER

## 2024-08-19 NOTE — PROGRESS NOTES
Subjective   Patient ID: Tanisha Petersen is a 53 y.o. female who presents for No chief complaint on file..  HPI  10/3/19: 49-year-old  woman with very severe COPD (FEV1 29% predicted, 9/26/17), alpha one antitrypsin carrier (MZ), and with 6 mm right lower lobe and a 4 mm right lower lobe lung nodules presents for routine follow-up. The patient stopped taking Tudorza 2 months ago because she did not see any benefit from it. However, she has been more short of breath since then and has been wheezing daily with occasional cough productive of white sputum. No hospitalizations or ER visits for COPD exacerbation in 6 months. She has been taking Combivent 8 times a day. She needs a new nebulizer. She does not have medications for her nebulizer. She is trying to quit smoking and has cut down with Chantix to 1.5-2 cigarettes per day. Denies weight loss, anorexia, hemoptysis or chest pain.    01/22/2020: Since her last visit she has quit smoking. She had a 6 minute walk which did show that she now requires oxygen with exertion and she also had a new pulmonary function test. She was tried on Trelegy but did not care for it and is now back on Symbicort and Tudorza. She does use Combivent for rescue as albuterol has never worked for her. She has noticed more shortness of breath over the last 6 months, especially when she is active. She did not bring her oxygen with her today she does not want to carry the tanks. I will try to get her a by mouth C but it will depend on whether or not insurance will cover it. I did explain to her she can purchase them if she is able. She needs a nocturnal pulse oximetry study as she has not been using oxygen at night and was unsure if she needed to     04/14/2020: Since the last visit, patient's breathing is mostly unchanged. No new chest pain, cough, sputum, fever, chills or night sweats. Had PFTs done (results below). She started smoking a few cigarettes again with her mother's recent  diagnosis with cancer and the COVID crisis.    08/11/2020: Since the last visit, patient's breathing has been improving. CAT 24. Stopped her prednisone. Continues to smoke. interested in quitting. Patient had repeat PFTs, 6MWT done (results below).     12/15/2020: Since the last visit, patient's breathing has been improving. She saw Dr. Landrum from allergy and was started on Nucala. She has noticed great improvement in her breathing. Continues to abstain from smoking.     04/13/2021: Since the last visit, patient's breathing is mostly unchanged. No new chest pain, cough, sputum, fever, chills or night sweats. Breathing worsened when she was on vacation. Smoked a few cigarettes. Attributes the worsening of her breathing to carbohydrates in her diet. Patient had repeat CT and PFTs done (results below).     11/30/2021: Since the last visit, patient's breathing has been worsening. She attributes the worsening to the change in weather. Has been wheezing more often and occasionally taking 10mg of prednisone. Continues to abstain from smoking. Had repeat lul done (results below).     05/24/2022: Since the last visit, patient's breathing is mostly unchanged. No new chest pain, cough, sputum, fever, chills or night sweats. Has put some 15pounds since she stopped smoking. Compliant with her inhalers. She was switched from Nucala to Fosenra with some sx improvement. Patient had repeat breathing test, 6MWT, done (results below).     01/10/2023: Since the last visit, patient's breathing has been improving. No new chest pain, cough, sputum, fever, chills or night sweats. Participating in pulmonary rehab. Compliant with her inhalers. Uses symbicort twice daily and prn combivent 3-4xday. No night symptoms. Got CT cardiac scoring in 12/2022 with 7-8 pleural based nodules.     07/25/2023: She is having allergic reaction to Fasenra. Since the last visit, patient's breathing is mostly unchanged. No new chest pain, cough, sputum,  fever, chills or night sweats. Just got off a month long prednisone course. Patient had repeat breathing test, 6MWT, CT scan done (results below).     1/10/24 she is here today for follow-up.  Patient unfortunately got RSV in November.  She was treated with doxycycline and prednisone from her PCP.  Patient is still not at baseline.  She has not been able to go down below 10 mg of prednisone as she is still short of breath.  She is also still coughing and wheezing.  She continues to use Symbicort but it is no longer covered by insurance we will try the generic.  She needs her Combivent refilled today also    08/19/24 she is here today for follow-up.  She has been doing well.  She has lost 47 pounds.  Her breathing has improved significantly.  Her new PFT does show an improvement in her lung function.  Her chest CT does show a small 4 millimeter new nodule.  She also has a 7 mm and another 4 mm.  Patient is high risk and her mother did die from lung cancer.    Previous pulmonary history:   She has no history of recurrent infections, or lung disease as a child. She was previously told she has COPD . She currently is on supplemental oxygen. She has never been to pulmonary rehab. Does recall having AECOPD requiring antibiotics or prednisone.     Inhalers/nebulized medications: Symbicort, Tudorza, Combivent     Hospitalization History:  She has not been hospitalized over the last year for breathing related problem.     Sleep history:  Denies snoring, apneas, feeling tired during the day or taking naps during the day. Admits to feeling tired during the day.     Comorbidities:   Alpha 1 carrier    SH:  smoking: former smoker  drinking: none  illicit drug use: none     Occupation: (Full questionnaire on exposures obtained, discussed with the patient and scanned to EMR)  No known exposure to asbestos, silica or beryllium     Family History:  No family history of lung diseases or cancer     Imaging history: (I have personally  reviewed the imaging below)  07/21/2023 Stable nodule  Got CT cardiac scoring in 12/2022 with 7-8 pleural based nodules.   03/2021 -> CT with stable nodules  03/30/2020 -> CT mostly unchanged   4/19: Chest Ct - small subcentimeter nodules    PFTs:  06/27/2023 -> Ratio of 0.41/FEV1 1.27L (41%)(no BD response)/FVC 3.08L (79%)/TLC 98%/RVtoTLC ratio 0.51/DLCO 54%  05/23/2022: -> Ratio of 0.41/FEV1 1.34L (43%)(no BD response)/FVC 3.27L (83%)  11/30/2021 -> Ratio of 0.37/FEV1 1.16L (37%)(no BD response)/FVC 3.11L (79%)/DLCO 57%   03/22/2021-> Ratio of 0.38/FEV1 1.12L (36%) (+ BD response)/FVC 2.93L (74%)/DLCO 50->60%  07/08/2020 -> Ratio of 0.35/FEV1 1.24L (39%) (+ BD response)/FVC 3.53L (89%)/%/RVtoTLC ratio 0.45/DLCO 48%   11/12/19 // -> FEV1/FVC ratio .35 /FEV1 28% (+ BD response)/FVC 65% /DLCO 33% /TLC/RV to TLC ratio .66     6 MWTs:   06/24/2023->on 429 m. Peak SpO2 of 98%. [place]efty SpO2 93%.   05/23/2022 ->on RA, 271m. Peak SpO2 of 99%. [place]efty SpO2 93%.   07/08/2020 ->on RA, 322 m without desaturation. [place]efty SpO2 of 96%.   01/08/2020: she walked 222 m and her 02 went to 87% she requires oxygen with exertion     Lung biopsy: None on record     Echo:   07/15/2020 -> Normal EF, diastolic dysfunction, with normal LA, RV size and function     Labs:   IGE of 1200  Review of Systems   All other systems reviewed and are negative.      Objective   Physical Exam  Vitals and nursing note reviewed.   Constitutional:       Appearance: Normal appearance. She is obese.   HENT:      Head: Normocephalic.      Nose: Nose normal.   Eyes:      Pupils: Pupils are equal, round, and reactive to light.   Cardiovascular:      Rate and Rhythm: Normal rate.   Pulmonary:      Effort: Pulmonary effort is normal.      Breath sounds: Normal breath sounds.   Neurological:      General: No focal deficit present.      Mental Status: She is alert and oriented to person, place, and time. Mental status is at baseline.   Psychiatric:         Mood and  Affect: Mood normal.         Behavior: Behavior normal.         Thought Content: Thought content normal.         Assessment/Plan     # Asthma COPD overlap   - FEV1 35%    - Continue Symbicort and add Spiriva (she did not like Trelegy). Continue with Combivent one puff up to every 4 hours as needed for acute shortness of breath or Duonebs by nebulizer only as needed for shortness of breath   - She is waiting to start pulmonary rehab.   - No emphysema on CT and sx suggest asthma-copd overlap. IGE of 1200. CT without sx of ABPA  -referral made to allergy clinic with Dr. Landrum and started on Nucala, now on Fosenra  -discussed in length the role of lung transplantation down the line but she will need to completely abstain from smoking, enrol in pulmonary rehab and lose weight  1/10/24 she is on Combivent and Symbicort, She is on Tespire now through her immunologist. Symbicort is no longer covered. We will try the generic   08/19/24 she is on generic symbicort and she uses her albuterol as needed. She is on the Tespire and is doing well     # On chronic prednisone:  -10mg daily. will work on weaning off. very resistant  -added Ca and Vitamin D  -stopped taking prednisone in 06/2020. Now only uses intermittently  1/10/24 since she had RSV she is using 10 mg of prednisone daily   08/19/24 RSV resolved no longer on prednisone     # Hypoxia -RESOLVED   - she needed oxygen with exertion and sleep  -did not need oxygen with ambulation on last walk test    #. Lung nodules:  - 6 mm and 4 mm right lower lobe lung nodules in a patient with history of and current heavy smoking, therefore high risk for malignancy   - Repeat chest CT in April 2020, one year from previous chest CT for continued surveillance. Stable  -repeat CT in April 2021. stable.  -no further surveillance needed  Got CT cardiac scoring in 12/2022 with 7-8 pleural based nodules. Repeat CT in 6-9 months stabel  -repeat CT in 1 year  1/10/24 she had a chest CT from  7/23. Stable nodules dating back to at least 3/21. Will repeat in 1 year   08/19/24 will repeat a ct in 6 months for a new 4 mm nodule in addition to her two other nodules. Her mother did die from lung cancer     # RSV   - Had RSV in November    - saw her PCP, was treated with Doxyc and Prednisone   - still coughing and using prednisone but is not at baseline    # Alpha-1 antitrypsin deficiency carrier   - rechecked given degree of obstruction. level of 100    #. Tobacco abuse,    - She quit smoking in 10/2019 but relapsed with a few cigarettes a few weeks ago  -recounseled for 6 minutes  -smoke free on Chantix  1/10/24 she is not smoking  08/19/24 she has refrained from smoking     Ms. Petersen it was a pleasure seeing you in the office today. We discussed the following:     - Please continue your Symbicort and Combivent   - You will  need a new chest CT in 6 months     Follow-up in 6 months with repeat breathing test, walking test      Follow up with Dr. Smith for your COPD and your new CT    GAVINO Velazquez-KOJO 08/19/24 12:31 PM

## 2024-08-20 DIAGNOSIS — J45.40 MODERATE PERSISTENT ASTHMA WITHOUT COMPLICATION (HHS-HCC): ICD-10-CM

## 2024-08-21 RX ORDER — LORATADINE 10 MG/1
10 TABLET ORAL DAILY
Qty: 90 TABLET | Refills: 0 | Status: SHIPPED | OUTPATIENT
Start: 2024-08-21

## 2024-10-21 ENCOUNTER — APPOINTMENT (OUTPATIENT)
Dept: PRIMARY CARE | Facility: CLINIC | Age: 54
End: 2024-10-21
Payer: MEDICARE

## 2024-10-21 VITALS
TEMPERATURE: 98.5 F | WEIGHT: 195.6 LBS | OXYGEN SATURATION: 98 % | HEART RATE: 89 BPM | HEIGHT: 67 IN | DIASTOLIC BLOOD PRESSURE: 82 MMHG | BODY MASS INDEX: 30.7 KG/M2 | SYSTOLIC BLOOD PRESSURE: 102 MMHG

## 2024-10-21 DIAGNOSIS — G44.209 ACUTE NON INTRACTABLE TENSION-TYPE HEADACHE: ICD-10-CM

## 2024-10-21 DIAGNOSIS — J40 BRONCHITIS: Primary | ICD-10-CM

## 2024-10-21 PROCEDURE — 3008F BODY MASS INDEX DOCD: CPT | Performed by: PHYSICIAN ASSISTANT

## 2024-10-21 PROCEDURE — 99214 OFFICE O/P EST MOD 30 MIN: CPT | Performed by: PHYSICIAN ASSISTANT

## 2024-10-21 RX ORDER — IBUPROFEN 800 MG/1
800 TABLET ORAL EVERY 8 HOURS PRN
Qty: 60 TABLET | Refills: 3 | Status: SHIPPED | OUTPATIENT
Start: 2024-10-21 | End: 2025-10-21

## 2024-10-21 RX ORDER — DOXYCYCLINE 100 MG/1
100 CAPSULE ORAL 2 TIMES DAILY
Qty: 20 CAPSULE | Refills: 0 | Status: SHIPPED | OUTPATIENT
Start: 2024-10-21 | End: 2024-10-31

## 2024-10-21 RX ORDER — PREDNISONE 10 MG/1
10 TABLET ORAL 2 TIMES DAILY
Qty: 14 TABLET | Refills: 0 | Status: SHIPPED | OUTPATIENT
Start: 2024-10-21 | End: 2024-10-28

## 2024-10-21 ASSESSMENT — PATIENT HEALTH QUESTIONNAIRE - PHQ9
1. LITTLE INTEREST OR PLEASURE IN DOING THINGS: NOT AT ALL
SUM OF ALL RESPONSES TO PHQ9 QUESTIONS 1 AND 2: 0
2. FEELING DOWN, DEPRESSED OR HOPELESS: NOT AT ALL

## 2024-10-21 NOTE — PROGRESS NOTES
Subjective     HPI   Tanisha Petersen is a 54 y.o. year old female patient with presenting to clinic with concern for   Chief Complaint   Patient presents with    URI     Ear pain, sore throat, chest congestion. X6 days. Took some doxy that she had 3 days worth.        Tanisha presents to clinic for ear pain and cough.    COPD- Quit smoking 2024, 8 months ago.  Pulm- [facility]- very severe COPD (FEV1 29% predicted, 17), alpha one antitrypsin carrier (MZ), and with 6 mm right lower lobe and a 4 mm right lower lobe lung nodules       Obesity:   Taking compounded tirzepitide from the internet. Poor appetite.   Concern for muscle wasting in addition to unknown additives. Suspected B12 additive to her compounded tirzepatide IM currently.     Trying to be more active and going on vacation with lots of walking and being active.        GERD worsened since starting GLP-1. Reinforced appropriate diet. Encouraged proteins & fiber and staying adequately hydrated          Patient Active Problem List   Diagnosis    Abnormal mammogram of left breast    Alpha-1-antitrypsin deficiency carrier    Asthma    Hypoxia    Cigarette nicotine dependence without complication    COPD (chronic obstructive pulmonary disease) (Multi)    Lung nodules    RSV (respiratory syncytial virus infection)    Personal history of nicotine dependence    Palpitations       Past Medical History:   Diagnosis Date    Hematuria 2023    Oral thrush 2023    Personal history of nicotine dependence 2022    History of tobacco abuse      Past Surgical History:   Procedure Laterality Date    HYSTERECTOMY  2017    Hysterectomy    OTHER SURGICAL HISTORY  10/19/2022    Tubal ligation    OTHER SURGICAL HISTORY  10/19/2022     section      Family History   Problem Relation Name Age of Onset    No Known Problems Other        Social History     Tobacco Use    Smoking status: Former     Average packs/day: 1 pack/day for 39.1 years (39.1  ttl pk-yrs)     Types: Cigarettes     Start date: 1985    Smokeless tobacco: Never    Tobacco comments:     Started smoking again after vacation with a friend. Needs help quitting.    Substance Use Topics    Alcohol use: Never        Current Outpatient Medications:     albuterol 90 mcg/actuation inhaler, Inhale 2 puffs every 4 hours if needed for wheezing or shortness of breath., Disp: , Rfl:     budesonide-formoteroL (Symbicort) 160-4.5 mcg/actuation inhaler, Inhale 2 puffs 2 times a day. Rinse mouth with water after use to reduce aftertaste and incidence of candidiasis. Do not swallow., Disp: 30 g, Rfl: 2    famotidine (Pepcid) 40 mg tablet, Take 1 tablet (40 mg) by mouth once daily at bedtime., Disp: 90 tablet, Rfl: 1    ipratropium-albuteroL (Combivent Respimat)  mcg/actuation inhaler, Inhale 1 puff 4 times a day as needed for wheezing (do not exceed 6 times daily)., Disp: 8 g, Rfl: 11    loratadine (Claritin) 10 mg tablet, TAKE 1 TABLET BY MOUTH ONCE DAILY, Disp: 90 tablet, Rfl: 0    montelukast (Singulair) 10 mg tablet, Take 1 tablet (10 mg) by mouth once daily at bedtime., Disp: 90 tablet, Rfl: 3    nebulizers misc, 1 Units by Not Applicable route 1 time. Patient to use nebulizer machine with DuoNeb every 4 hours as needed for SOB, Disp: , Rfl:     pantoprazole (ProtoNix) 40 mg EC tablet, Take 1 tablet (40 mg) by mouth once daily in the morning. Take before meals., Disp: 90 tablet, Rfl: 3    doxycycline (Vibramycin) 100 mg capsule, Take 1 capsule (100 mg) by mouth 2 times a day for 10 days. Take with at least 8 ounces (large glass) of water, do not lie down for 30 minutes after, Disp: 20 capsule, Rfl: 0    predniSONE (Deltasone) 10 mg tablet, Take 1 tablet (10 mg) by mouth 2 times a day for 7 days., Disp: 14 tablet, Rfl: 0     Review of Systems  Constitutional: Denies fever  HEENT: Denies ST, earache  CVS: Denies Chest pain  Pulmonary: Denies wheezing, SOB  GI: Denies N/V  : Denies  "dysuria  Musculoskeletal:  Denies myalgia  Neuro: Denies focal weakness or numbness.  Skin: Denies Rashes.  *Review of Systems is negative unless otherwise mentioned in HPI or ROS above.    Objective   /82   Pulse 89   Temp 36.9 °C (98.5 °F)   Ht 1.702 m (5' 7\")   Wt 88.7 kg (195 lb 9.6 oz)   SpO2 98%   BMI 30.64 kg/m²  reviewed Body mass index is 30.64 kg/m².     Physical Exam  Constitutional: NAD.  Resting comfortably.  Head: Atraumatic, normocephalic.  ENT: Moist oral mucosa. Nasal mucosa wnl.   Cardiac: Regular rate & rhythm.   Pulmonary: Lungs clear bilat  GI: Soft, Nontender, nondistended.   Musculoskeletal: No peripheral edema.   Skin: No evidence of trauma. No rashes  Psych: Intact judgement and insight.    .Assessment/Plan       "

## 2024-11-20 ENCOUNTER — APPOINTMENT (OUTPATIENT)
Dept: PRIMARY CARE | Facility: CLINIC | Age: 54
End: 2024-11-20
Payer: MEDICARE

## 2024-11-20 VITALS
HEIGHT: 67 IN | SYSTOLIC BLOOD PRESSURE: 102 MMHG | TEMPERATURE: 97.2 F | WEIGHT: 194.8 LBS | OXYGEN SATURATION: 97 % | HEART RATE: 78 BPM | DIASTOLIC BLOOD PRESSURE: 76 MMHG | BODY MASS INDEX: 30.57 KG/M2

## 2024-11-20 DIAGNOSIS — J44.9 CHRONIC OBSTRUCTIVE PULMONARY DISEASE, UNSPECIFIED COPD TYPE (MULTI): ICD-10-CM

## 2024-11-20 DIAGNOSIS — Z23 NEED FOR INFLUENZA VACCINATION: ICD-10-CM

## 2024-11-20 DIAGNOSIS — E78.5 BORDERLINE HYPERLIPIDEMIA: ICD-10-CM

## 2024-11-20 DIAGNOSIS — L98.9 SKIN LESION OF LEFT ARM: ICD-10-CM

## 2024-11-20 DIAGNOSIS — Z12.31 SCREENING MAMMOGRAM FOR BREAST CANCER: ICD-10-CM

## 2024-11-20 DIAGNOSIS — Z00.00 INITIAL MEDICARE ANNUAL WELLNESS VISIT: Primary | ICD-10-CM

## 2024-11-20 DIAGNOSIS — E55.9 VITAMIN D INSUFFICIENCY: ICD-10-CM

## 2024-11-20 PROCEDURE — 3008F BODY MASS INDEX DOCD: CPT | Performed by: PHYSICIAN ASSISTANT

## 2024-11-20 PROCEDURE — G0438 PPPS, INITIAL VISIT: HCPCS | Performed by: PHYSICIAN ASSISTANT

## 2024-11-20 PROCEDURE — G0008 ADMIN INFLUENZA VIRUS VAC: HCPCS | Performed by: PHYSICIAN ASSISTANT

## 2024-11-20 PROCEDURE — 90656 IIV3 VACC NO PRSV 0.5 ML IM: CPT | Performed by: PHYSICIAN ASSISTANT

## 2024-11-20 ASSESSMENT — ACTIVITIES OF DAILY LIVING (ADL)
BATHING: INDEPENDENT
TAKING_MEDICATION: INDEPENDENT
MANAGING_FINANCES: INDEPENDENT
DOING_HOUSEWORK: INDEPENDENT
DRESSING: INDEPENDENT
GROCERY_SHOPPING: INDEPENDENT

## 2024-11-20 NOTE — PROGRESS NOTES
Subjective   HPI   Tanisha Petersen is a 54 y.o. year old female patient with presenting to clinic with concern for Medicare Visit     Chief Complaint   Patient presents with    Medicare Annual Wellness Visit Initial       Noted recurrent lesion on left forearm near wrist, dorsum of forearm. Has been years.     COPD- Quit smoking Feb 2024, sees pulm. Exac by weather changes.        - very severe COPD (FEV1 29% predicted, 9/26/17)       - alpha one antitrypsin carrier (MZ)       - 6 mm right lower lobe and a 4 mm right lower lobe lung nodules   -Symbicort  -Combivent  -Singulair    Seasonal allergies  -loratidine    Obesity:   Taking compounded tirzepitide from the internet  Reinforced appropriate diet. Encouraged proteins & fiber and staying adequately hydrated  Wt Readings from Last 6 Encounters:   11/20/24 88.4 kg (194 lb 12.8 oz)   10/21/24 88.7 kg (195 lb 9.6 oz)   08/19/24 95.5 kg (210 lb 9.6 oz)   05/30/24 101 kg (222 lb 9.6 oz)   02/28/24 110 kg (242 lb)   01/10/24 115 kg (253 lb 12.8 oz)     GERD   -famotidine  -pantoprazole 40     Cardiology  Stress test 10/10/23  Summary:   1. Adequate level of stress achieved.   2. No clinical or electrocardiographic evidence for ischemia at a maximal infusion.   3. Nuclear image results are reported separately.     Echo reviewed from 10/7/2022   CONCLUSIONS:   1. Left ventricular systolic function is normal with a 55-60% estimated ejection fraction.   2. Excessive respiratory changes-consider tamponade, ventilation related, etc.   3. Spectral Doppler shows an impaired relaxation pattern of left ventricular diastolic filling.          Patient Care Team:  Jennyfer Levine PA-C as PCP - General       Past Medical, Surgical, and Family History reviewed and updated in chart.    Reviewed all medications by prescribing practitioner or clinical pharmacist (such as prescriptions, OTCs, herbal therapies and supplements) and documented in the medical record.     Preventative Health    Health Maintenance Due   Topic Date Due    HIV Screening  Never done    MMR Vaccine (1 of 1 - Standard series) Never done    Hepatitis C Screening  Never done    Hepatitis B Vaccine (1 of 3 - 19+ 3-dose series) Never done    Hepatitis A Vaccine (1 of 2 - Risk 2-dose series) Never done    Influenza Vaccine (1) 09/01/2024    Breast Cancer Screening  12/07/2024    Hemoglobin A1C  12/07/2024            Topic Date Due    MMR Vaccines (1 of 1 - Standard series) Never done    Hepatitis B Vaccines (1 of 3 - 19+ 3-dose series) Never done    Hepatitis A Vaccines (1 of 2 - Risk 2-dose series) Never done        Immunizations Reviewed  Immunization History   Administered Date(s) Administered    Flu vaccine (IIV4), preservative free *Check age/dose* 11/17/2017, 09/15/2020, 09/28/2021    Flu vaccine, quadrivalent, no egg protein, age 6 month or greater (FLUCELVAX) 12/13/2019    Flu vaccine, trivalent, preservative free, age 6 months and greater (Fluarix/Fluzone/Flulaval) 11/03/2015    Influenza, Seasonal, Quadrivalent, Adjuvanted 11/16/2022    Influenza, Unspecified 09/23/2012    Influenza, seasonal, injectable 12/17/2014    Pfizer Purple Cap SARS-CoV-2 01/29/2021, 02/19/2021, 09/28/2021    Pneumococcal conjugate vaccine, 13-valent (PREVNAR 13) 11/17/2017    Pneumococcal conjugate vaccine, 20-valent (PREVNAR 20) 12/08/2022    Pneumococcal polysaccharide vaccine, 23-valent, age 2 years and older (PNEUMOVAX 23) 09/23/2012    Tdap vaccine, age 7 year and older (BOOSTRIX, ADACEL) 12/08/2022    Zoster vaccine, recombinant, adult (SHINGRIX) 12/08/2022        Problem List Reviewed  Patient Active Problem List   Diagnosis    Abnormal mammogram of left breast    Alpha-1-antitrypsin deficiency carrier    Asthma    Hypoxia    Cigarette nicotine dependence without complication    COPD (chronic obstructive pulmonary disease) (Multi)    Lung nodules    RSV (respiratory syncytial virus infection)    Personal history of nicotine dependence     Palpitations       Past Medical History:   Diagnosis Date    Hematuria 2023    Oral thrush 2023    Personal history of nicotine dependence 2022    History of tobacco abuse      Past Surgical History:   Procedure Laterality Date    HYSTERECTOMY  2017    Hysterectomy    OTHER SURGICAL HISTORY  10/19/2022    Tubal ligation    OTHER SURGICAL HISTORY  10/19/2022     section      Family History   Problem Relation Name Age of Onset    No Known Problems Other        Social History     Tobacco Use    Smoking status: Former     Average packs/day: 1 pack/day for 39.1 years (39.1 ttl pk-yrs)     Types: Cigarettes     Start date:     Smokeless tobacco: Never    Tobacco comments:     Started smoking again after vacation with a friend. Needs help quitting.    Substance Use Topics    Alcohol use: Never        Medications Reviewed  Current Outpatient Medications on File Prior to Visit   Medication Sig Dispense Refill    albuterol 90 mcg/actuation inhaler Inhale 2 puffs every 4 hours if needed for wheezing or shortness of breath.      budesonide-formoteroL (Symbicort) 160-4.5 mcg/actuation inhaler Inhale 2 puffs 2 times a day. Rinse mouth with water after use to reduce aftertaste and incidence of candidiasis. Do not swallow. 30 g 2    famotidine (Pepcid) 40 mg tablet Take 1 tablet (40 mg) by mouth once daily at bedtime. 90 tablet 1    ibuprofen 800 mg tablet Take 1 tablet (800 mg) by mouth every 8 hours if needed for mild pain (1 - 3) or headaches (pain). 60 tablet 3    ipratropium-albuteroL (Combivent Respimat)  mcg/actuation inhaler Inhale 1 puff 4 times a day as needed for wheezing (do not exceed 6 times daily). 8 g 11    loratadine (Claritin) 10 mg tablet TAKE 1 TABLET BY MOUTH ONCE DAILY 90 tablet 0    montelukast (Singulair) 10 mg tablet Take 1 tablet (10 mg) by mouth once daily at bedtime. 90 tablet 3    nebulizers misc 1 Units by Not Applicable route 1 time. Patient to use nebulizer  "machine with DuoNeb every 4 hours as needed for SOB      pantoprazole (ProtoNix) 40 mg EC tablet Take 1 tablet (40 mg) by mouth once daily in the morning. Take before meals. 90 tablet 3     No current facility-administered medications on file prior to visit.        Review of Systems  Constitutional: Denies fever  HEENT: Denies ST, earache  CVS: Denies Chest pain  Pulmonary: Denies wheezing, SOB  GI: Denies N/V  : Denies dysuria  Musculoskeletal:  Denies myalgia  Neuro: Denies focal weakness or numbness.  Skin: Denies Rashes.  *Review of Systems is negative unless otherwise mentioned in HPI or ROS above.      [facility]  /76   Pulse 78   Temp 36.2 °C (97.2 °F)   Ht 1.702 m (5' 7\")   Wt 88.4 kg (194 lb 12.8 oz)   SpO2 97%   BMI 30.51 kg/m²  reviewed Body mass index is 30.51 kg/m².     Physical Exam  Constitutional: NAD. Afebrile. Resting comfortably.  ENT: Nasal mucosa and oropharynx: moist oral mucosa. Posterior oropharynx nonerythematous. No posterior pharyngeal streaking.  Eyes: PERRLA. EOM intact. No vertical or circular nystagmus.  Lymph: No anterior cervical chain or submandibular lymphadenopathy. No sentinel lymph nodes.  Cardiac: Regular rate & rhythm. No murmur, gallops, or rubs.  Pulmonary: Lungs clear to auscultation bilaterally with good aeration. No wheezes, rhonchi, or rales. Normal WOB.  GI: Soft, Nontender, nondistended. No guarding. Normal BS x4.  : No suprapubic tenderness. No CVA tenderness to percussion.   Musculoskeletal: No peripheral edema.   Skin: No evidence of trauma. No rashes  Neuro: No focal neuro deficits. Normal gait without assistive devices.  Psych: Intact judgement and insight.    MDM        .Assessment/Plan   Problem List Items Addressed This Visit             ICD-10-CM    COPD (chronic obstructive pulmonary disease) (Multi) J44.9    Relevant Orders    Disability Placard     Other Visit Diagnoses         Codes    Initial Medicare annual wellness visit    -  " Primary Z00.00    Skin lesion of left arm     L98.9    Relevant Orders    Referral to Dermatology    Borderline hyperlipidemia     E78.5    Relevant Orders    CBC    Comprehensive Metabolic Panel    TSH with reflex to Free T4 if abnormal    Lipid Panel    Vitamin D insufficiency     E55.9    Relevant Orders    Vitamin D 25-Hydroxy,Total (for eval of Vitamin D levels)    Screening mammogram for breast cancer     Z12.31    Relevant Orders    BI mammo bilateral screening tomosynthesis

## 2024-12-19 DIAGNOSIS — B99.9 RECURRENT INFECTIONS: Primary | ICD-10-CM

## 2025-01-03 DIAGNOSIS — K21.9 GASTROESOPHAGEAL REFLUX DISEASE WITHOUT ESOPHAGITIS: ICD-10-CM

## 2025-01-03 DIAGNOSIS — J45.40 MODERATE PERSISTENT ASTHMA WITHOUT COMPLICATION (HHS-HCC): ICD-10-CM

## 2025-01-05 RX ORDER — LORATADINE 10 MG/1
10 TABLET ORAL DAILY
Qty: 90 TABLET | Refills: 1 | Status: SHIPPED | OUTPATIENT
Start: 2025-01-05

## 2025-01-05 RX ORDER — FAMOTIDINE 40 MG/1
40 TABLET, FILM COATED ORAL NIGHTLY
Qty: 90 TABLET | Refills: 1 | Status: SHIPPED | OUTPATIENT
Start: 2025-01-05

## 2025-01-14 ENCOUNTER — TELEPHONE (OUTPATIENT)
Dept: PULMONOLOGY | Facility: CLINIC | Age: 55
End: 2025-01-14
Payer: MEDICARE

## 2025-01-14 DIAGNOSIS — U07.1 COVID: Primary | ICD-10-CM

## 2025-01-14 RX ORDER — NIRMATRELVIR AND RITONAVIR 300-100 MG
3 KIT ORAL 2 TIMES DAILY
Qty: 30 TABLET | Refills: 0 | Status: SHIPPED | OUTPATIENT
Start: 2025-01-14 | End: 2025-01-19

## 2025-01-14 NOTE — TELEPHONE ENCOUNTER
Pt called stating she tested positive for Covid and would like to know if you can order her Plaxovid and send to [facility] . Thank you .

## 2025-01-29 ENCOUNTER — TELEMEDICINE (OUTPATIENT)
Dept: PRIMARY CARE | Facility: CLINIC | Age: 55
End: 2025-01-29
Payer: MEDICARE

## 2025-01-29 DIAGNOSIS — J40 BRONCHITIS: Primary | ICD-10-CM

## 2025-01-29 DIAGNOSIS — U07.1 COVID-19: ICD-10-CM

## 2025-01-29 PROCEDURE — 99213 OFFICE O/P EST LOW 20 MIN: CPT | Performed by: PHYSICIAN ASSISTANT

## 2025-01-29 PROCEDURE — 1036F TOBACCO NON-USER: CPT | Performed by: PHYSICIAN ASSISTANT

## 2025-01-29 RX ORDER — PREDNISONE 20 MG/1
40 TABLET ORAL DAILY
Qty: 10 TABLET | Refills: 0 | Status: SHIPPED | OUTPATIENT
Start: 2025-01-29 | End: 2025-02-03

## 2025-01-29 RX ORDER — BENZONATATE 200 MG/1
200 CAPSULE ORAL 3 TIMES DAILY PRN
Qty: 30 CAPSULE | Refills: 0 | Status: SHIPPED | OUTPATIENT
Start: 2025-01-29 | End: 2025-02-28

## 2025-01-29 RX ORDER — DOXYCYCLINE 100 MG/1
100 CAPSULE ORAL 2 TIMES DAILY
Qty: 20 CAPSULE | Refills: 0 | Status: SHIPPED | OUTPATIENT
Start: 2025-01-29 | End: 2025-02-08

## 2025-01-29 NOTE — PROGRESS NOTES
An interactive audio and video telecommunication system which permits real time communications between the patient (at the originating site) and provider (at the distant site) was utilized to provide this telehealth service.   Verbal consent was requested and obtained from Tanisha Petersen on this date, 25 for a telehealth visit.     Subjective    Tanisha Petersen is a 54 y.o. year old female patient presenting for virtual visit   Chief Complaint   Patient presents with    Cough      COVID+ 25 faint positive day of onset after her son was sick. She was sent in Paxlovid by  Dr Smith. Had rebound symptoms 5 days after completion of PAXLOVID.   Ongoing cough 2 weeks later, requesting antibiotics. Retested COVID very strong + on Saturday 3 days ago.  Headache, rhinorrhea, chest congestion, wheezing. Started steroids 2 days ago.     Hx asthma and COPD. Smoker  On symbicort, combivent respimat, montelukast and albuterol    Patient Active Problem List   Diagnosis    Abnormal mammogram of left breast    Alpha-1-antitrypsin deficiency carrier    Asthma    Hypoxia    Cigarette nicotine dependence without complication    COPD (chronic obstructive pulmonary disease) (Multi)    Lung nodules    RSV (respiratory syncytial virus infection)    Personal history of nicotine dependence    Palpitations       Past Medical History:   Diagnosis Date    Hematuria 2023    Oral thrush 2023    Personal history of nicotine dependence 2022    History of tobacco abuse      Past Surgical History:   Procedure Laterality Date    HYSTERECTOMY  2017    Hysterectomy    OTHER SURGICAL HISTORY  10/19/2022    Tubal ligation    OTHER SURGICAL HISTORY  10/19/2022     section      Family History   Problem Relation Name Age of Onset    No Known Problems Other        Social History     Tobacco Use    Smoking status: Former     Average packs/day: 1 pack/day for 39.1 years (39.1 ttl pk-yrs)     Types: Cigarettes      Start date: 1985    Smokeless tobacco: Never    Tobacco comments:     Started smoking again after vacation with a friend. Needs help quitting.    Substance Use Topics    Alcohol use: Never        Current Outpatient Medications:     albuterol 90 mcg/actuation inhaler, Inhale 2 puffs every 4 hours if needed for wheezing or shortness of breath., Disp: , Rfl:     benzonatate (Tessalon) 200 mg capsule, Take 1 capsule (200 mg) by mouth 3 times a day as needed for cough. Do not crush or chew., Disp: 30 capsule, Rfl: 0    budesonide-formoteroL (Symbicort) 160-4.5 mcg/actuation inhaler, Inhale 2 puffs 2 times a day. Rinse mouth with water after use to reduce aftertaste and incidence of candidiasis. Do not swallow., Disp: 30 g, Rfl: 2    doxycycline (Vibramycin) 100 mg capsule, Take 1 capsule (100 mg) by mouth 2 times a day for 10 days. Take with at least 8 ounces (large glass) of water, do not lie down for 30 minutes after, Disp: 20 capsule, Rfl: 0    famotidine (Pepcid) 40 mg tablet, TAKE 1 TABLET BY MOUTH AT BEDTIME, Disp: 90 tablet, Rfl: 1    ibuprofen 800 mg tablet, Take 1 tablet (800 mg) by mouth every 8 hours if needed for mild pain (1 - 3) or headaches (pain)., Disp: 60 tablet, Rfl: 3    ipratropium-albuteroL (Combivent Respimat)  mcg/actuation inhaler, Inhale 1 puff 4 times a day as needed for wheezing (do not exceed 6 times daily)., Disp: 8 g, Rfl: 11    loratadine (Claritin) 10 mg tablet, TAKE 1 TABLET BY MOUTH ONCE DAILY, Disp: 90 tablet, Rfl: 1    montelukast (Singulair) 10 mg tablet, Take 1 tablet (10 mg) by mouth once daily at bedtime., Disp: 90 tablet, Rfl: 3    nebulizers misc, 1 Units by Not Applicable route 1 time. Patient to use nebulizer machine with DuoNeb every 4 hours as needed for SOB, Disp: , Rfl:     pantoprazole (ProtoNix) 40 mg EC tablet, Take 1 tablet (40 mg) by mouth once daily in the morning. Take before meals., Disp: 90 tablet, Rfl: 3    predniSONE (Deltasone) 20 mg tablet, Take 2  tablets (40 mg) by mouth once daily for 5 days., Disp: 10 tablet, Rfl: 0     Review of Systems    Review of Systems:   Constitutional: Denies fever  HEENT: Denies ST, earache  CVS: Denies Chest pain  Pulmonary: Denies wheezing, SOB  GI: Denies N/V  : Denies dysuria  Musculoskeletal:  Denies myalgia  Neuro: Denies focal weakness or numbness.  Skin: Denies Rashes.  *Review of Systems is negative unless otherwise mentioned in HPI or ROS above.     Objective    VITALS  Pt does not have vitals available at time of visit.    Exam       Limited physical exam in virtual platform  Nontoxic. No acute distress.  Nonlabored breathing.    Assessment/Plan      Problem List Items Addressed This Visit    None  Visit Diagnoses       Bronchitis    -  Primary    Relevant Medications    doxycycline (Vibramycin) 100 mg capsule    predniSONE (Deltasone) 20 mg tablet    benzonatate (Tessalon) 200 mg capsule    COVID-19

## 2025-02-07 ENCOUNTER — APPOINTMENT (OUTPATIENT)
Dept: RADIOLOGY | Facility: HOSPITAL | Age: 55
End: 2025-02-07
Payer: MEDICARE

## 2025-02-19 DIAGNOSIS — J43.2 CENTRILOBULAR EMPHYSEMA (MULTI): ICD-10-CM

## 2025-02-19 RX ORDER — IPRATROPIUM BROMIDE AND ALBUTEROL 20; 100 UG/1; UG/1
1 SPRAY, METERED RESPIRATORY (INHALATION) 4 TIMES DAILY PRN
Qty: 8 G | Refills: 11 | Status: SHIPPED | OUTPATIENT
Start: 2025-02-19

## 2025-02-20 ENCOUNTER — APPOINTMENT (OUTPATIENT)
Dept: PULMONOLOGY | Facility: CLINIC | Age: 55
End: 2025-02-20
Payer: MEDICARE

## 2025-02-20 VITALS
WEIGHT: 185.6 LBS | DIASTOLIC BLOOD PRESSURE: 62 MMHG | OXYGEN SATURATION: 93 % | SYSTOLIC BLOOD PRESSURE: 125 MMHG | BODY MASS INDEX: 29.07 KG/M2 | HEART RATE: 85 BPM

## 2025-02-20 DIAGNOSIS — J43.2 CENTRILOBULAR EMPHYSEMA (MULTI): ICD-10-CM

## 2025-02-20 DIAGNOSIS — F17.210 CIGARETTE NICOTINE DEPENDENCE WITHOUT COMPLICATION: ICD-10-CM

## 2025-02-20 DIAGNOSIS — J45.40 MODERATE PERSISTENT ASTHMA WITHOUT COMPLICATION (HHS-HCC): Primary | ICD-10-CM

## 2025-02-20 DIAGNOSIS — R91.8 LUNG NODULES: ICD-10-CM

## 2025-02-20 PROCEDURE — 99215 OFFICE O/P EST HI 40 MIN: CPT | Performed by: PEDIATRICS

## 2025-02-20 PROCEDURE — 1036F TOBACCO NON-USER: CPT | Performed by: PEDIATRICS

## 2025-02-20 RX ORDER — TEZEPELUMAB-EKKO 210 MG/1.9ML
210 INJECTION, SOLUTION SUBCUTANEOUS
COMMUNITY

## 2025-02-20 RX ORDER — VARENICLINE TARTRATE 0.5 (11)-1
KIT ORAL
Qty: 1 EACH | Refills: 0 | Status: SHIPPED | OUTPATIENT
Start: 2025-02-20 | End: 2025-05-21

## 2025-02-20 NOTE — PROGRESS NOTES
Subjective   Patient ID: Tanisha Petersen is a 54 y.o. female who presents for COPD/asthma, hypoxia    HPI    10/3/19: 49-year-old  woman with very severe COPD (FEV1 29% predicted, 9/26/17), alpha one antitrypsin carrier (MZ), and with 6 mm right lower lobe and a 4 mm right lower lobe lung nodules presents for routine follow-up. The patient stopped taking Tudorza 2 months ago because she did not see any benefit from it. However, she has been more short of breath since then and has been wheezing daily with occasional cough productive of white sputum. No hospitalizations or ER visits for COPD exacerbation in 6 months. She has been taking Combivent 8 times a day. She needs a new nebulizer. She does not have medications for her nebulizer. She is trying to quit smoking and has cut down with Chantix to 1.5-2 cigarettes per day. Denies weight loss, anorexia, hemoptysis or chest pain.     01/22/2020: Since her last visit she has quit smoking. She had a 6 minute walk which did show that she now requires oxygen with exertion and she also had a new pulmonary function test. She was tried on Trelegy but did not care for it and is now back on Symbicort and Tudorza. She does use Combivent for rescue as albuterol has never worked for her. She has noticed more shortness of breath over the last 6 months, especially when she is active. She did not bring her oxygen with her today she does not want to carry the tanks. I will try to get her a by mouth C but it will depend on whether or not insurance will cover it. I did explain to her she can purchase them if she is able. She needs a nocturnal pulse oximetry study as she has not been using oxygen at night and was unsure if she needed to      04/14/2020: Since the last visit, patient's breathing is mostly unchanged. No new chest pain, cough, sputum, fever, chills or night sweats. Had PFTs done (results below). She started smoking a few cigarettes again with her mother's recent  diagnosis with cancer and the COVID crisis.     08/11/2020: Since the last visit, patient's breathing has been improving. CAT 24. Stopped her prednisone. Continues to smoke. interested in quitting. Patient had repeat PFTs, 6MWT done (results below).      12/15/2020: Since the last visit, patient's breathing has been improving. She saw Dr. Landrum from allergy and was started on Nucala. She has noticed great improvement in her breathing. Continues to abstain from smoking.      04/13/2021: Since the last visit, patient's breathing is mostly unchanged. No new chest pain, cough, sputum, fever, chills or night sweats. Breathing worsened when she was on vacation. Smoked a few cigarettes. Attributes the worsening of her breathing to carbohydrates in her diet. Patient had repeat CT and PFTs done (results below).      11/30/2021: Since the last visit, patient's breathing has been worsening. She attributes the worsening to the change in weather. Has been wheezing more often and occasionally taking 10mg of prednisone. Continues to abstain from smoking. Had repeat lul done (results below).      05/24/2022: Since the last visit, patient's breathing is mostly unchanged. No new chest pain, cough, sputum, fever, chills or night sweats. Has put some 15pounds since she stopped smoking. Compliant with her inhalers. She was switched from Nucala to Fosenra with some sx improvement. Patient had repeat breathing test, 6MWT, done (results below).      01/10/2023: Since the last visit, patient's breathing has been improving. No new chest pain, cough, sputum, fever, chills or night sweats. Participating in pulmonary rehab. Compliant with her inhalers. Uses symbicort twice daily and prn combivent 3-4xday. No night symptoms. Got CT cardiac scoring in 12/2022 with 7-8 pleural based nodules.      07/25/2023: She is having allergic reaction to Fasenra. Since the last visit, patient's breathing is mostly unchanged. No new chest pain, cough,  sputum, fever, chills or night sweats. Just got off a month long prednisone course. Patient had repeat breathing test, 6MWT, CT scan done (results below).      1/10/24 she is here today for follow-up.  Patient unfortunately got RSV in November.  She was treated with doxycycline and prednisone from her PCP.  Patient is still not at baseline.  She has not been able to go down below 10 mg of prednisone as she is still short of breath.  She is also still coughing and wheezing.  She continues to use Symbicort but it is no longer covered by insurance we will try the generic.  She needs her Combivent refilled today also     08/19/24 she is here today for follow-up.  She has been doing well.  She has lost 47 pounds.  Her breathing has improved significantly.  Her new PFT does show an improvement in her lung function.  Her chest CT does show a small 4 millimeter new nodule.  She also has a 7 mm and another 4 mm.  Patient is high risk and her mother did die from lung cancer.     2/20/2025:  Ms Petersen is doing well.  She is using symbicort and albuterol as needed.  She is using tezspire as well (Dr Landrum manages this).  She started smoking again after quitting for about a year    Previous pulmonary history:   She has no history of recurrent infections, or lung disease as a child. She was previously told she has COPD . She currently is on supplemental oxygen. She has never been to pulmonary rehab. Does recall having AECOPD requiring antibiotics or prednisone.     Inhalers/nebulized medications: Symbicort, Tudorza, Combivent     Hospitalization History:  She has not been hospitalized over the last year for breathing related problem.     Sleep history:  Denies snoring, apneas, feeling tired during the day or taking naps during the day. Admits to feeling tired during the day.     Comorbidities:   Alpha 1 carrier     SH:  smoking: former smoker. quit 2024 but started again recently  drinking: none  illicit drug use: none      Occupation: (Full questionnaire on exposures obtained, discussed with the patient and scanned to EMR)  No known exposure to asbestos, silica or beryllium     Family History:  No family history of lung diseases or cancer     Imaging history: (I have personally reviewed the imaging below)  7/22/2024 LDCT: moderate emphysema, several stable  nodules  07/21/2023 Stable nodule  Got CT cardiac scoring in 12/2022 with 7-8 pleural based nodules.   03/2021 -> CT with stable nodules  03/30/2020 -> CT mostly unchanged   4/19: Chest Ct - small subcentimeter nodules     PFTs:  7/22/2024: FVC 88%, FEV1 42%, FEF 25-75 13%, +BD, %, DLCO 86%  06/27/2023 -> Ratio of 0.41/FEV1 1.27L (41%)(no BD response)/FVC 3.08L (79%)/TLC 98%/RVtoTLC ratio 0.51/DLCO 54%  05/23/2022: -> Ratio of 0.41/FEV1 1.34L (43%)(no BD response)/FVC 3.27L (83%)  11/30/2021 -> Ratio of 0.37/FEV1 1.16L (37%)(no BD response)/FVC 3.11L (79%)/DLCO 57%   03/22/2021-> Ratio of 0.38/FEV1 1.12L (36%) (+ BD response)/FVC 2.93L (74%)/DLCO 50->60%  07/08/2020 -> Ratio of 0.35/FEV1 1.24L (39%) (+ BD response)/FVC 3.53L (89%)/%/RVtoTLC ratio 0.45/DLCO 48%   11/12/19 // -> FEV1/FVC ratio .35 /FEV1 28% (+ BD response)/FVC 65% /DLCO 33% /TLC/RV to TLC ratio .66     6 MWTs:   06/24/2023->on 429 m. Peak SpO2 of 98%. Lefty SpO2 93%.   05/23/2022 ->on RA, 271m. Peak SpO2 of 99%. Lefty SpO2 93%.   07/08/2020 ->on RA, 322 m without desaturation. Lefty SpO2 of 96%.   01/08/2020: she walked 222 m and her 02 went to 87% she requires oxygen with exertion    Review of Systems    See scanned documents attached to this note for review of systems, and appropriate scales/scores for this visit.      Objective   Physical Exam  Constitutional:       Appearance: Normal appearance.   HENT:      Head: Normocephalic and atraumatic.      Mouth/Throat:      Pharynx: Oropharynx is clear.   Cardiovascular:      Rate and Rhythm: Normal rate and regular rhythm.      Pulses: Normal pulses.       Heart sounds: Normal heart sounds.   Pulmonary:      Effort: Pulmonary effort is normal.      Breath sounds: Normal breath sounds. No wheezing, rhonchi or rales.   Abdominal:      General: Bowel sounds are normal.      Palpations: Abdomen is soft.   Musculoskeletal:         General: Normal range of motion.   Skin:     General: Skin is warm and dry.   Neurological:      General: No focal deficit present.      Mental Status: She is alert and oriented to person, place, and time.   Psychiatric:         Mood and Affect: Mood normal.       Assessment/Plan     # Asthma COPD overlap   - FEV1 42%    - Continue Symbicort and add Spiriva (she did not like Trelegy). Continue with Combivent one puff up to every 4 hours as needed for acute shortness of breath or Duonebs by nebulizer only as needed for shortness of breath   - She is waiting to start pulmonary rehab.   - No emphysema on CT and sx suggest asthma-copd overlap. IGE of 1200. CT without sx of ABPA  -referral made to allergy clinic with Dr. Landrum and started on Nucala, now on Fosenra  -discussed in length the role of lung transplantation down the line but she will need to completely abstain from smoking, enrol in pulmonary rehab and lose weight  1/10/24 she is on Combivent and Symbicort, She is on Tespire now through her immunologist. Symbicort is no longer covered. We will try the generic   08/19/24 she is on generic symbicort and she uses her albuterol as needed. She is on the Tespire and is doing well   2/20/2025:  continue symbicort and albuterol. Continue tezspire     # On chronic prednisone:  -10mg daily. will work on weaning off. very resistant  -added Ca and Vitamin D  -stopped taking prednisone in 06/2020. Now only uses intermittently  1/10/24 since she had RSV she is using 10 mg of prednisone daily   08/19/24 RSV resolved no longer on prednisone    2/20/2025:  no longer needs prednisone chronically      #. Lung nodules:  - 6 mm and 4 mm right lower lobe  lung nodules in a patient with history of and current heavy smoking, therefore high risk for malignancy   - Repeat chest CT in April 2020, one year from previous chest CT for continued surveillance. Stable  -repeat CT in April 2021. stable.  -no further surveillance needed  Got CT cardiac scoring in 12/2022 with 7-8 pleural based nodules. Repeat CT in 6-9 months stabel  -repeat CT in 1 year  1/10/24 she had a chest CT from 7/23. Stable nodules dating back to at least 3/21. Will repeat in 1 year   08/19/24 will repeat a ct in 6 months for a new 4 mm nodule in addition to her two other nodules. Her mother did die from lung cancer   2/20/2025:  LDCT scheduled for 7/23/2025       #. Tobacco abuse,    - She quit smoking in 10/2019 but relapsed with a few cigarettes a few weeks ago  -recounseled for 6 minutes  -smoke free on Chantix  1/10/24 she is not smoking  08/19/24 she has refrained from smoking   2/20/2025:  restarted.   Ordered chantix starter pack     Follow up 6 months       Osmar Smith MD 02/20/25 9:21 AM

## 2025-02-26 ENCOUNTER — TELEPHONE (OUTPATIENT)
Dept: PULMONOLOGY | Facility: CLINIC | Age: 55
End: 2025-02-26
Payer: MEDICARE

## 2025-02-26 NOTE — TELEPHONE ENCOUNTER
Pt called and said that the chantix is only 4 weeks with no refills she said that she is needing more than that since her insurance will only pay for it once a year. She said that she would like the whole thing at one time just in case she slips up with smoking during the year. Local pharmacy is [facility] in [place]     Thank you!

## 2025-05-14 LAB
25(OH)D3+25(OH)D2 SERPL-MCNC: 32 NG/ML (ref 30–100)
ALBUMIN SERPL-MCNC: 4.4 G/DL (ref 3.6–5.1)
ALP SERPL-CCNC: 76 U/L (ref 37–153)
ALT SERPL-CCNC: 19 U/L (ref 6–29)
ANION GAP SERPL CALCULATED.4IONS-SCNC: 11 MMOL/L (CALC) (ref 7–17)
AST SERPL-CCNC: 15 U/L (ref 10–35)
BILIRUB SERPL-MCNC: 0.4 MG/DL (ref 0.2–1.2)
BUN SERPL-MCNC: 17 MG/DL (ref 7–25)
CALCIUM SERPL-MCNC: 9.4 MG/DL (ref 8.6–10.4)
CHLORIDE SERPL-SCNC: 105 MMOL/L (ref 98–110)
CHOLEST SERPL-MCNC: 222 MG/DL
CHOLEST/HDLC SERPL: 3.6 (CALC)
CO2 SERPL-SCNC: 27 MMOL/L (ref 20–32)
CREAT SERPL-MCNC: 0.74 MG/DL (ref 0.5–1.03)
EGFRCR SERPLBLD CKD-EPI 2021: 97 ML/MIN/1.73M2
ERYTHROCYTE [DISTWIDTH] IN BLOOD BY AUTOMATED COUNT: 13 % (ref 11–15)
GLUCOSE SERPL-MCNC: 85 MG/DL (ref 65–99)
HCT VFR BLD AUTO: 45.7 % (ref 35–45)
HDLC SERPL-MCNC: 61 MG/DL
HGB BLD-MCNC: 14.7 G/DL (ref 11.7–15.5)
LDLC SERPL CALC-MCNC: 142 MG/DL (CALC)
MCH RBC QN AUTO: 30.8 PG (ref 27–33)
MCHC RBC AUTO-ENTMCNC: 32.2 G/DL (ref 32–36)
MCV RBC AUTO: 95.6 FL (ref 80–100)
NONHDLC SERPL-MCNC: 161 MG/DL (CALC)
PLATELET # BLD AUTO: 359 THOUSAND/UL (ref 140–400)
PMV BLD REES-ECKER: 11.1 FL (ref 7.5–12.5)
POTASSIUM SERPL-SCNC: 4.2 MMOL/L (ref 3.5–5.3)
PROT SERPL-MCNC: 6.8 G/DL (ref 6.1–8.1)
RBC # BLD AUTO: 4.78 MILLION/UL (ref 3.8–5.1)
SODIUM SERPL-SCNC: 143 MMOL/L (ref 135–146)
TRIGL SERPL-MCNC: 86 MG/DL
TSH SERPL-ACNC: 1.98 MIU/L
WBC # BLD AUTO: 12.2 THOUSAND/UL (ref 3.8–10.8)

## 2025-05-16 DIAGNOSIS — E78.00 HYPERCHOLESTEROLEMIA: Primary | ICD-10-CM

## 2025-05-16 DIAGNOSIS — D72.829 LEUKOCYTOSIS, UNSPECIFIED TYPE: ICD-10-CM

## 2025-05-16 RX ORDER — PRAVASTATIN SODIUM 10 MG/1
10 TABLET ORAL NIGHTLY
Qty: 90 TABLET | Refills: 3 | Status: SHIPPED | OUTPATIENT
Start: 2025-05-16

## 2025-05-21 ENCOUNTER — APPOINTMENT (OUTPATIENT)
Dept: PRIMARY CARE | Facility: CLINIC | Age: 55
End: 2025-05-21
Payer: MEDICARE

## 2025-05-21 VITALS
HEIGHT: 66 IN | TEMPERATURE: 97.7 F | DIASTOLIC BLOOD PRESSURE: 64 MMHG | SYSTOLIC BLOOD PRESSURE: 104 MMHG | HEART RATE: 93 BPM | OXYGEN SATURATION: 99 % | WEIGHT: 181.2 LBS | BODY MASS INDEX: 29.12 KG/M2

## 2025-05-21 DIAGNOSIS — F17.210 CIGARETTE NICOTINE DEPENDENCE, UNCOMPLICATED: ICD-10-CM

## 2025-05-21 DIAGNOSIS — Z12.31 SCREENING MAMMOGRAM FOR BREAST CANCER: ICD-10-CM

## 2025-05-21 DIAGNOSIS — B37.9 ANTIBIOTIC-INDUCED YEAST INFECTION: ICD-10-CM

## 2025-05-21 DIAGNOSIS — T36.95XA ANTIBIOTIC-INDUCED YEAST INFECTION: ICD-10-CM

## 2025-05-21 DIAGNOSIS — J44.1 COPD WITH ACUTE EXACERBATION (MULTI): ICD-10-CM

## 2025-05-21 DIAGNOSIS — J45.40 MODERATE PERSISTENT ASTHMA WITHOUT COMPLICATION (HHS-HCC): ICD-10-CM

## 2025-05-21 DIAGNOSIS — E78.00 HYPERCHOLESTEROLEMIA: ICD-10-CM

## 2025-05-21 DIAGNOSIS — J44.9 CHRONIC OBSTRUCTIVE PULMONARY DISEASE, UNSPECIFIED COPD TYPE (MULTI): ICD-10-CM

## 2025-05-21 DIAGNOSIS — K21.9 GASTROESOPHAGEAL REFLUX DISEASE WITHOUT ESOPHAGITIS: ICD-10-CM

## 2025-05-21 DIAGNOSIS — Z00.00 MEDICARE ANNUAL WELLNESS VISIT, SUBSEQUENT: Primary | ICD-10-CM

## 2025-05-21 RX ORDER — DOXYCYCLINE 100 MG/1
100 CAPSULE ORAL 2 TIMES DAILY
Qty: 20 CAPSULE | Refills: 0 | Status: SHIPPED | OUTPATIENT
Start: 2025-05-21 | End: 2025-05-31

## 2025-05-21 RX ORDER — FLUCONAZOLE 150 MG/1
150 TABLET ORAL
Qty: 3 TABLET | Refills: 0 | Status: SHIPPED | OUTPATIENT
Start: 2025-05-25

## 2025-05-21 RX ORDER — VARENICLINE TARTRATE 1 MG/1
1 TABLET, FILM COATED ORAL 2 TIMES DAILY
Qty: 60 TABLET | Refills: 2 | Status: SHIPPED | OUTPATIENT
Start: 2025-05-21 | End: 2025-08-19

## 2025-05-21 RX ORDER — PREDNISONE 10 MG/1
10 TABLET ORAL DAILY
Qty: 7 TABLET | Refills: 0 | Status: SHIPPED | OUTPATIENT
Start: 2025-05-21 | End: 2025-05-28

## 2025-05-21 ASSESSMENT — ENCOUNTER SYMPTOMS
LOSS OF SENSATION IN FEET: 0
OCCASIONAL FEELINGS OF UNSTEADINESS: 1
DEPRESSION: 0

## 2025-05-21 ASSESSMENT — ACTIVITIES OF DAILY LIVING (ADL)
TAKING_MEDICATION: INDEPENDENT
GROCERY_SHOPPING: INDEPENDENT
DOING_HOUSEWORK: INDEPENDENT
MANAGING_FINANCES: INDEPENDENT
DRESSING: INDEPENDENT
BATHING: INDEPENDENT

## 2025-06-17 DIAGNOSIS — B99.9 RECURRENT INFECTIONS: Primary | ICD-10-CM

## 2025-07-23 ENCOUNTER — HOSPITAL ENCOUNTER (OUTPATIENT)
Dept: RADIOLOGY | Facility: HOSPITAL | Age: 55
Discharge: HOME | End: 2025-07-23
Payer: MEDICARE

## 2025-07-23 DIAGNOSIS — Z87.891 PERSONAL HISTORY OF NICOTINE DEPENDENCE: ICD-10-CM

## 2025-07-23 PROCEDURE — 71271 CT THORAX LUNG CANCER SCR C-: CPT | Performed by: RADIOLOGY

## 2025-07-23 PROCEDURE — 71271 CT THORAX LUNG CANCER SCR C-: CPT

## 2025-08-01 ENCOUNTER — TELEMEDICINE (OUTPATIENT)
Dept: PRIMARY CARE | Facility: CLINIC | Age: 55
End: 2025-08-01
Payer: MEDICARE

## 2025-08-01 DIAGNOSIS — N39.0 ACUTE UTI: Primary | ICD-10-CM

## 2025-08-01 PROCEDURE — 99213 OFFICE O/P EST LOW 20 MIN: CPT | Performed by: FAMILY MEDICINE

## 2025-08-01 RX ORDER — PHENAZOPYRIDINE HYDROCHLORIDE 100 MG/1
100 TABLET, FILM COATED ORAL 3 TIMES DAILY PRN
Qty: 15 TABLET | Refills: 0 | Status: SHIPPED | OUTPATIENT
Start: 2025-08-01 | End: 2025-08-06

## 2025-08-01 RX ORDER — CIPROFLOXACIN 500 MG/1
500 TABLET, FILM COATED ORAL 2 TIMES DAILY
Qty: 10 TABLET | Refills: 0 | Status: SHIPPED | OUTPATIENT
Start: 2025-08-01 | End: 2025-08-06

## 2025-08-01 NOTE — PROGRESS NOTES
Subjective   Patient ID: Tanisha Petersen is a 54 y.o. female who presents for UTI symptoms    HPI  Virtual Visit    An interactive audio and video telecommunication system which permits real time communications between the patient (at the originating site) and provider (at the distant site) was utilized to provide this telehealth service.   Verbal consent was requested and obtained from Tanisha Petersen on this date, 08/01/25 for a telehealth visit and the patient's location was confirmed at the time of the visit.    Has been having increased urine frequency and dysuria since this morning, has also noticed hematuria. No nausea/vomiting. No fver.     Review of Systems  As per HPI    Vitals:   due to telehealth visit, vitals not obtained, patient did not have them available at the time of the visit       Objective   Physical Exam  Physical exam done virtually through the computer screen     Gen: NAD  eyes: conjunctivae normal   Nose: external nose normal   Resp: does not appear to be short of breath at present time, no audible wheezing    Assessment/Plan   Problem List Items Addressed This Visit    None  Visit Diagnoses         Acute UTI    -  Primary    Relevant Medications    ciprofloxacin (Cipro) 500 mg tablet    phenazopyridine (Pyridium) 100 mg tablet    Other Relevant Orders    Urinalysis with Reflex Culture and Microscopic

## 2025-08-03 LAB
APPEARANCE UR: CLEAR
BACTERIA #/AREA URNS HPF: ABNORMAL /HPF
BACTERIA UR CULT: ABNORMAL
BACTERIA UR CULT: ABNORMAL
BILIRUB UR QL STRIP: ABNORMAL
COLOR UR: ABNORMAL
GLUCOSE UR QL STRIP: NEGATIVE
HGB UR QL STRIP: ABNORMAL
HYALINE CASTS #/AREA URNS LPF: ABNORMAL /LPF
KETONES UR QL STRIP: NEGATIVE
LEUKOCYTE ESTERASE UR QL STRIP: ABNORMAL
NITRITE UR QL STRIP: POSITIVE
PH UR STRIP: 5.5 [PH] (ref 5–8)
PROT UR QL STRIP: ABNORMAL
RBC #/AREA URNS HPF: ABNORMAL /HPF
SERVICE CMNT-IMP: ABNORMAL
SP GR UR STRIP: 1.01 (ref 1–1.03)
SQUAMOUS #/AREA URNS HPF: ABNORMAL /HPF
WBC #/AREA URNS HPF: ABNORMAL /HPF

## 2025-08-05 ENCOUNTER — TELEMEDICINE (OUTPATIENT)
Dept: PRIMARY CARE | Facility: CLINIC | Age: 55
End: 2025-08-05
Payer: MEDICARE

## 2025-08-05 ENCOUNTER — TELEPHONE (OUTPATIENT)
Dept: PRIMARY CARE | Facility: CLINIC | Age: 55
End: 2025-08-05
Payer: MEDICARE

## 2025-08-05 DIAGNOSIS — R39.9 URINARY SYMPTOM OR SIGN: Primary | ICD-10-CM

## 2025-08-05 DIAGNOSIS — N39.0 ACUTE UTI: Primary | ICD-10-CM

## 2025-08-05 PROCEDURE — 99213 OFFICE O/P EST LOW 20 MIN: CPT | Performed by: PHYSICIAN ASSISTANT

## 2025-08-05 RX ORDER — SULFAMETHOXAZOLE AND TRIMETHOPRIM 800; 160 MG/1; MG/1
1 TABLET ORAL 2 TIMES DAILY
Qty: 10 TABLET | Refills: 0 | Status: SHIPPED | OUTPATIENT
Start: 2025-08-05 | End: 2025-08-10

## 2025-08-05 NOTE — TELEPHONE ENCOUNTER
Patient is on her last day of ciprofloxacin (Cipro) 500 mg tablet , she is still having UTI symptoms - will you prescribe a different medication?

## 2025-08-05 NOTE — PROGRESS NOTES
An interactive audio and video telecommunication system which permits real time communications between the patient (at the originating site) and provider (at the distant site) was utilized to provide this telehealth service.   Verbal consent was requested and obtained from Tanisha Petersen on this date, 08/05/25 for a telehealth visit and the patient's location was confirmed at the time of the visit.    Subjective    Tanisha Petersen is a 54 y.o. year old female patient presenting for virtual visit   Chief Complaint   Patient presents with    UTI      UTI symptoms. Recently treated with Cipro x 5 days. Culture shows sensitivity. Improved significantly, but has not resolved completely yet. Feels like symptoms are increasing again and she has still not completed the course. Has not missed any doses.   Changing to Bactrim which also shows sensitivity  Sent orders for repeat UA.      Problem List[1]    Medical History[2]   Surgical History[3]   Family History[4]   Social History     Tobacco Use    Smoking status: Former     Average packs/day: 1 pack/day for 39.1 years (39.1 ttl pk-yrs)     Types: Cigarettes     Start date: 1985    Smokeless tobacco: Never    Tobacco comments:     Started smoking again after vacation with a friend. Needs help quitting.    Substance Use Topics    Alcohol use: Never      Current Medications[5]     Review of Systems    Review of Systems:   Constitutional: Denies fever  HEENT: Denies ST, earache  CVS: Denies Chest pain  Pulmonary: Denies wheezing, SOB  GI: Denies N/V  : Denies dysuria  Musculoskeletal:  Denies myalgia  Neuro: Denies focal weakness or numbness.  Skin: Denies Rashes.  *Review of Systems is negative unless otherwise mentioned in HPI or ROS above.     Objective    VITALS  Pt does not have vitals available at time of visit.    Exam       Limited physical exam in virtual platform  Nontoxic. No acute distress.  Nonlabored breathing.    Assessment/Plan      Problem List Items  Addressed This Visit    None  Visit Diagnoses         Acute UTI    -  Primary    Relevant Medications    sulfamethoxazole-trimethoprim (Bactrim DS) 800-160 mg tablet                      [1]   Patient Active Problem List  Diagnosis    Abnormal mammogram of left breast    Alpha-1-antitrypsin deficiency carrier    Asthma    Hypoxia    Cigarette nicotine dependence without complication    COPD (chronic obstructive pulmonary disease) (Multi)    Lung nodules    RSV (respiratory syncytial virus infection)    Personal history of nicotine dependence    Palpitations    Leukocytosis    Hypercholesterolemia   [2]   Past Medical History:  Diagnosis Date    Hematuria 2023    Oral thrush 2023    Personal history of nicotine dependence 2022    History of tobacco abuse   [3]   Past Surgical History:  Procedure Laterality Date    HYSTERECTOMY  2017    Hysterectomy    OTHER SURGICAL HISTORY  10/19/2022    Tubal ligation    OTHER SURGICAL HISTORY  10/19/2022     section   [4]   Family History  Problem Relation Name Age of Onset    No Known Problems Other     [5]   Current Outpatient Medications:     albuterol 90 mcg/actuation inhaler, Inhale 2 puffs every 4 hours if needed for wheezing or shortness of breath., Disp: , Rfl:     budesonide-formoteroL (Symbicort) 160-4.5 mcg/actuation inhaler, Inhale 2 puffs 2 times a day. Rinse mouth with water after use to reduce aftertaste and incidence of candidiasis. Do not swallow., Disp: 30 g, Rfl: 2    ciprofloxacin (Cipro) 500 mg tablet, Take 1 tablet (500 mg) by mouth 2 times a day for 5 days. Take with food, Disp: 10 tablet, Rfl: 0    famotidine (Pepcid) 40 mg tablet, TAKE 1 TABLET BY MOUTH AT BEDTIME, Disp: 90 tablet, Rfl: 1    ibuprofen 800 mg tablet, Take 1 tablet (800 mg) by mouth every 8 hours if needed for mild pain (1 - 3) or headaches (pain)., Disp: 60 tablet, Rfl: 3    ipratropium-albuteroL (Combivent Respimat)  mcg/actuation inhaler, Inhale 1  puff 4 times a day as needed for wheezing (do not exceed 6 times daily)., Disp: 8 g, Rfl: 11    loratadine (Claritin) 10 mg tablet, TAKE 1 TABLET BY MOUTH ONCE DAILY, Disp: 90 tablet, Rfl: 1    montelukast (Singulair) 10 mg tablet, Take 1 tablet (10 mg) by mouth once daily at bedtime., Disp: 90 tablet, Rfl: 3    nebulizers misc, 1 Units by Not Applicable route 1 time. Patient to use nebulizer machine with DuoNeb every 4 hours as needed for SOB, Disp: , Rfl:     pantoprazole (ProtoNix) 40 mg EC tablet, Take 1 tablet (40 mg) by mouth once daily in the morning. Take before meals., Disp: 90 tablet, Rfl: 3    phenazopyridine (Pyridium) 100 mg tablet, Take 1 tablet (100 mg) by mouth 3 times a day as needed for bladder spasms for up to 5 days., Disp: 15 tablet, Rfl: 0    sulfamethoxazole-trimethoprim (Bactrim DS) 800-160 mg tablet, Take 1 tablet by mouth 2 times a day for 5 days., Disp: 10 tablet, Rfl: 0    tezepelumab-ekko (Tezspire) SubQ syringe, Inject 210 mg under the skin every 28 (twenty-eight) days., Disp: , Rfl:     varenicline tartrate (Chantix MARIA DEL CARMEN) 0.5 mg (11)- 1 mg (42) tablet, Use as directed on package instructions; try to quit smoking after 1 week., Disp: 1 each, Rfl: 0    varenicline tartrate (Chantix) 1 mg tablet, Take 1 tablet (1 mg) by mouth 2 times a day. Take with full glass of water., Disp: 60 tablet, Rfl: 2

## 2025-08-06 LAB
S PN DA SERO 19F IGG SER-MCNC: 3.4 UG/ML
S PNEUM DA 1 IGG SER-MCNC: 1.2 UG/ML
S PNEUM DA 10A IGG SER-MCNC: <0.3 UG/ML
S PNEUM DA 11A IGG SER-MCNC: 1.4 UG/ML
S PNEUM DA 12F IGG SER-MCNC: <0.3 UG/ML
S PNEUM DA 14 IGG SER-MCNC: 4.4
S PNEUM DA 15B IGG SER-MCNC: 0.9 UG/ML
S PNEUM DA 17F IGG SER-MCNC: <0.3 UG/ML
S PNEUM DA 18C IGG SER-MCNC: 4.9
S PNEUM DA 19A IGG SER-MCNC: 1.7 UG/ML
S PNEUM DA 2 IGG SER-MCNC: 0.6 UG/ML
S PNEUM DA 20A IGG SER-MCNC: <0.3 UG/ML
S PNEUM DA 22F IGG SER-MCNC: 0.4 UG/ML
S PNEUM DA 23F IGG SER-MCNC: <0.3 UG/ML
S PNEUM DA 3 IGG SER-MCNC: 1.1 UG/ML
S PNEUM DA 33F IGG SER-MCNC: <0.3 UG/ML
S PNEUM DA 4 IGG SER-MCNC: 1.7 UG/ML
S PNEUM DA 5 IGG SER-MCNC: 1 UG/ML
S PNEUM DA 6B IGG SER-MCNC: 3.7 UG/ML
S PNEUM DA 7F IGG SER-MCNC: 2.5 UG/ML
S PNEUM DA 8 IGG SER-MCNC: <0.3 UG/ML
S PNEUM DA 9N IGG SER-MCNC: 1.2 UG/ML
S PNEUM DA 9V IGG SER-MCNC: 3.1 UG/ML

## 2025-08-08 ENCOUNTER — TELEMEDICINE (OUTPATIENT)
Dept: PRIMARY CARE | Facility: CLINIC | Age: 55
End: 2025-08-08
Payer: MEDICARE

## 2025-08-08 DIAGNOSIS — R10.2 PELVIC PRESSURE IN FEMALE: ICD-10-CM

## 2025-08-08 DIAGNOSIS — R42 DIZZINESSES: Primary | ICD-10-CM

## 2025-08-08 PROCEDURE — 99213 OFFICE O/P EST LOW 20 MIN: CPT | Performed by: PHYSICIAN ASSISTANT

## 2025-08-08 PROCEDURE — G2211 COMPLEX E/M VISIT ADD ON: HCPCS | Performed by: PHYSICIAN ASSISTANT

## 2025-08-08 NOTE — PROGRESS NOTES
"An interactive audio and video telecommunication system which permits real time communications between the patient (at the originating site) and provider (at the distant site) was utilized to provide this telehealth service.   Verbal consent was requested and obtained from Tanisha Petersen on this date, 08/08/25 for a telehealth visit and the patient's location was confirmed at the time of the visit.    Subjective    Tanisha Petersen is a 54 y.o. year old female patient presenting for virtual visit   Chief Complaint   Patient presents with    Vaginal Discharge     Pelvis feels \"warm\" but vaginal discharge is normal, no itching. Has been swimming. Unsure if maybe BV?     UTI 1 week ago. Treated with cipro. C&S showed sensitivity.   I spoke wit her 3 days ago. Changed to Bactrim per her request, also showed sensitivity    Episodes of dizziness an ongoing issue. Requesting labs    Problem List[1]    Medical History[2]   Surgical History[3]   Family History[4]   Social History     Tobacco Use    Smoking status: Former     Average packs/day: 1 pack/day for 39.1 years (39.1 ttl pk-yrs)     Types: Cigarettes     Start date: 1985    Smokeless tobacco: Never    Tobacco comments:     Started smoking again after vacation with a friend. Needs help quitting.    Substance Use Topics    Alcohol use: Never      Current Medications[5]     Review of Systems    Review of Systems:   Constitutional: Denies fever  HEENT: Denies ST, earache  CVS: Denies Chest pain  Pulmonary: Denies wheezing, SOB  GI: Denies N/V  : Denies dysuria  Musculoskeletal:  Denies myalgia  Neuro: Denies focal weakness or numbness.  Skin: Denies Rashes.  *Review of Systems is negative unless otherwise mentioned in HPI or ROS above.     Objective    VITALS  Pt does not have vitals available at time of visit.    Exam       Limited physical exam in virtual platform  Nontoxic. No acute distress.  Nonlabored breathing.    Assessment/Plan      Problem List Items " Addressed This Visit    None  Visit Diagnoses         Dizzinesses    -  Primary    Relevant Orders    CBC and Auto Differential    Comprehensive metabolic panel      Pelvic pressure in female                     [1]   Patient Active Problem List  Diagnosis    Abnormal mammogram of left breast    Alpha-1-antitrypsin deficiency carrier    Asthma    Hypoxia    Cigarette nicotine dependence without complication    COPD (chronic obstructive pulmonary disease) (Multi)    Lung nodules    RSV (respiratory syncytial virus infection)    Personal history of nicotine dependence    Palpitations    Leukocytosis    Hypercholesterolemia   [2]   Past Medical History:  Diagnosis Date    Hematuria 2023    Oral thrush 2023    Personal history of nicotine dependence 2022    History of tobacco abuse   [3]   Past Surgical History:  Procedure Laterality Date    HYSTERECTOMY  2017    Hysterectomy    OTHER SURGICAL HISTORY  10/19/2022    Tubal ligation    OTHER SURGICAL HISTORY  10/19/2022     section   [4]   Family History  Problem Relation Name Age of Onset    No Known Problems Other     [5]   Current Outpatient Medications:     albuterol 90 mcg/actuation inhaler, Inhale 2 puffs every 4 hours if needed for wheezing or shortness of breath., Disp: , Rfl:     budesonide-formoteroL (Symbicort) 160-4.5 mcg/actuation inhaler, Inhale 2 puffs 2 times a day. Rinse mouth with water after use to reduce aftertaste and incidence of candidiasis. Do not swallow., Disp: 30 g, Rfl: 2    famotidine (Pepcid) 40 mg tablet, TAKE 1 TABLET BY MOUTH AT BEDTIME, Disp: 90 tablet, Rfl: 1    ibuprofen 800 mg tablet, Take 1 tablet (800 mg) by mouth every 8 hours if needed for mild pain (1 - 3) or headaches (pain)., Disp: 60 tablet, Rfl: 3    ipratropium-albuteroL (Combivent Respimat)  mcg/actuation inhaler, Inhale 1 puff 4 times a day as needed for wheezing (do not exceed 6 times daily)., Disp: 8 g, Rfl: 11    loratadine  (Claritin) 10 mg tablet, TAKE 1 TABLET BY MOUTH ONCE DAILY, Disp: 90 tablet, Rfl: 1    montelukast (Singulair) 10 mg tablet, Take 1 tablet (10 mg) by mouth once daily at bedtime., Disp: 90 tablet, Rfl: 3    nebulizers misc, 1 Units by Not Applicable route 1 time. Patient to use nebulizer machine with DuoNeb every 4 hours as needed for SOB, Disp: , Rfl:     pantoprazole (ProtoNix) 40 mg EC tablet, Take 1 tablet (40 mg) by mouth once daily in the morning. Take before meals., Disp: 90 tablet, Rfl: 3    sulfamethoxazole-trimethoprim (Bactrim DS) 800-160 mg tablet, Take 1 tablet by mouth 2 times a day for 5 days., Disp: 10 tablet, Rfl: 0    tezepelumab-ekko (Tezspire) SubQ syringe, Inject 210 mg under the skin every 28 (twenty-eight) days., Disp: , Rfl:     varenicline tartrate (Chantix [facility]) 0.5 mg (11)- 1 mg (42) tablet, Use as directed on package instructions; try to quit smoking after 1 week., Disp: 1 each, Rfl: 0    varenicline tartrate (Chantix) 1 mg tablet, Take 1 tablet (1 mg) by mouth 2 times a day. Take with full glass of water., Disp: 60 tablet, Rfl: 2

## 2025-08-09 LAB
ALBUMIN SERPL-MCNC: 4.4 G/DL (ref 3.6–5.1)
ALP SERPL-CCNC: 87 U/L (ref 37–153)
ALT SERPL-CCNC: 19 U/L (ref 6–29)
ANION GAP SERPL CALCULATED.4IONS-SCNC: 8 MMOL/L (CALC) (ref 7–17)
APPEARANCE UR: CLEAR
AST SERPL-CCNC: 20 U/L (ref 10–35)
BACTERIA #/AREA URNS HPF: NORMAL /HPF
BACTERIA UR CULT: NORMAL
BASOPHILS # BLD AUTO: 49 CELLS/UL (ref 0–200)
BASOPHILS NFR BLD AUTO: 0.5 %
BILIRUB SERPL-MCNC: 0.4 MG/DL (ref 0.2–1.2)
BILIRUB UR QL STRIP: NEGATIVE
BUN SERPL-MCNC: 18 MG/DL (ref 7–25)
CALCIUM SERPL-MCNC: 9.4 MG/DL (ref 8.6–10.4)
CHLORIDE SERPL-SCNC: 101 MMOL/L (ref 98–110)
CO2 SERPL-SCNC: 28 MMOL/L (ref 20–32)
COLOR UR: YELLOW
CREAT SERPL-MCNC: 1.04 MG/DL (ref 0.5–1.03)
EGFRCR SERPLBLD CKD-EPI 2021: 65 ML/MIN/1.73M2
EOSINOPHIL # BLD AUTO: 78 CELLS/UL (ref 15–500)
EOSINOPHIL NFR BLD AUTO: 0.8 %
ERYTHROCYTE [DISTWIDTH] IN BLOOD BY AUTOMATED COUNT: 13.1 % (ref 11–15)
GLUCOSE SERPL-MCNC: 73 MG/DL (ref 65–139)
GLUCOSE UR QL STRIP: NEGATIVE
HCT VFR BLD AUTO: 42.7 % (ref 35–45)
HGB BLD-MCNC: 13.8 G/DL (ref 11.7–15.5)
HGB UR QL STRIP: NEGATIVE
HYALINE CASTS #/AREA URNS LPF: NORMAL /LPF
KETONES UR QL STRIP: NEGATIVE
LEUKOCYTE ESTERASE UR QL STRIP: NEGATIVE
LYMPHOCYTES # BLD AUTO: 2832 CELLS/UL (ref 850–3900)
LYMPHOCYTES NFR BLD AUTO: 29.2 %
MCH RBC QN AUTO: 31 PG (ref 27–33)
MCHC RBC AUTO-ENTMCNC: 32.3 G/DL (ref 32–36)
MCV RBC AUTO: 96 FL (ref 80–100)
MONOCYTES # BLD AUTO: 776 CELLS/UL (ref 200–950)
MONOCYTES NFR BLD AUTO: 8 %
NEUTROPHILS # BLD AUTO: 5966 CELLS/UL (ref 1500–7800)
NEUTROPHILS NFR BLD AUTO: 61.5 %
NITRITE UR QL STRIP: NEGATIVE
PH UR STRIP: 6 [PH] (ref 5–8)
PLATELET # BLD AUTO: 411 THOUSAND/UL (ref 140–400)
PMV BLD REES-ECKER: 10.1 FL (ref 7.5–12.5)
POTASSIUM SERPL-SCNC: 4.5 MMOL/L (ref 3.5–5.3)
PROT SERPL-MCNC: 6.9 G/DL (ref 6.1–8.1)
PROT UR QL STRIP: NEGATIVE
RBC # BLD AUTO: 4.45 MILLION/UL (ref 3.8–5.1)
RBC #/AREA URNS HPF: NORMAL /HPF
SERVICE CMNT-IMP: NORMAL
SODIUM SERPL-SCNC: 137 MMOL/L (ref 135–146)
SP GR UR STRIP: 1.02 (ref 1–1.03)
SQUAMOUS #/AREA URNS HPF: NORMAL /HPF
WBC # BLD AUTO: 9.7 THOUSAND/UL (ref 3.8–10.8)
WBC #/AREA URNS HPF: NORMAL /HPF

## 2025-08-11 ENCOUNTER — APPOINTMENT (OUTPATIENT)
Dept: PRIMARY CARE | Facility: CLINIC | Age: 55
End: 2025-08-11
Payer: MEDICARE

## 2025-08-11 VITALS
SYSTOLIC BLOOD PRESSURE: 112 MMHG | TEMPERATURE: 97.6 F | HEART RATE: 89 BPM | HEIGHT: 66 IN | BODY MASS INDEX: 30.37 KG/M2 | OXYGEN SATURATION: 94 % | DIASTOLIC BLOOD PRESSURE: 68 MMHG | WEIGHT: 189 LBS

## 2025-08-11 DIAGNOSIS — B96.89 BACTERIAL VAGINOSIS: Primary | ICD-10-CM

## 2025-08-11 DIAGNOSIS — B37.9 ANTIBIOTIC-INDUCED YEAST INFECTION: ICD-10-CM

## 2025-08-11 DIAGNOSIS — N76.0 BACTERIAL VAGINOSIS: Primary | ICD-10-CM

## 2025-08-11 DIAGNOSIS — T36.95XA ANTIBIOTIC-INDUCED YEAST INFECTION: ICD-10-CM

## 2025-08-11 PROCEDURE — 3008F BODY MASS INDEX DOCD: CPT | Performed by: PHYSICIAN ASSISTANT

## 2025-08-11 PROCEDURE — 99213 OFFICE O/P EST LOW 20 MIN: CPT | Performed by: PHYSICIAN ASSISTANT

## 2025-08-11 RX ORDER — FLUCONAZOLE 150 MG/1
150 TABLET ORAL
Qty: 4 TABLET | Refills: 0 | Status: SHIPPED | OUTPATIENT
Start: 2025-08-17 | End: 2025-08-11

## 2025-08-11 RX ORDER — METRONIDAZOLE 500 MG/1
500 TABLET ORAL 2 TIMES DAILY
Qty: 28 TABLET | Refills: 0 | Status: SHIPPED | OUTPATIENT
Start: 2025-08-11 | End: 2025-08-25

## 2025-08-11 RX ORDER — FLUCONAZOLE 150 MG/1
150 TABLET ORAL
Qty: 4 TABLET | Refills: 0 | Status: SHIPPED | OUTPATIENT
Start: 2025-08-11 | End: 2025-09-08

## 2025-08-11 ASSESSMENT — PATIENT HEALTH QUESTIONNAIRE - PHQ9
SUM OF ALL RESPONSES TO PHQ9 QUESTIONS 1 AND 2: 0
2. FEELING DOWN, DEPRESSED OR HOPELESS: NOT AT ALL
1. LITTLE INTEREST OR PLEASURE IN DOING THINGS: NOT AT ALL

## 2025-08-20 ENCOUNTER — APPOINTMENT (OUTPATIENT)
Dept: PULMONOLOGY | Facility: CLINIC | Age: 55
End: 2025-08-20
Payer: MEDICARE

## 2025-08-20 VITALS
SYSTOLIC BLOOD PRESSURE: 108 MMHG | WEIGHT: 190.6 LBS | DIASTOLIC BLOOD PRESSURE: 72 MMHG | OXYGEN SATURATION: 93 % | HEART RATE: 71 BPM | BODY MASS INDEX: 30.76 KG/M2

## 2025-08-20 DIAGNOSIS — J45.40 MODERATE PERSISTENT ASTHMA WITHOUT COMPLICATION (HHS-HCC): Primary | ICD-10-CM

## 2025-08-20 DIAGNOSIS — J43.2 CENTRILOBULAR EMPHYSEMA (MULTI): ICD-10-CM

## 2025-08-20 PROCEDURE — 99215 OFFICE O/P EST HI 40 MIN: CPT | Performed by: PEDIATRICS

## 2025-08-31 DIAGNOSIS — J45.40 MODERATE PERSISTENT ASTHMA WITHOUT COMPLICATION (HHS-HCC): ICD-10-CM

## 2025-08-31 DIAGNOSIS — K21.9 GASTROESOPHAGEAL REFLUX DISEASE WITHOUT ESOPHAGITIS: ICD-10-CM

## 2025-09-02 RX ORDER — PANTOPRAZOLE SODIUM 40 MG/1
40 TABLET, DELAYED RELEASE ORAL
Qty: 90 TABLET | Refills: 1 | Status: SHIPPED | OUTPATIENT
Start: 2025-09-02

## 2025-09-02 RX ORDER — MONTELUKAST SODIUM 10 MG/1
10 TABLET ORAL NIGHTLY
Qty: 90 TABLET | Refills: 1 | Status: SHIPPED | OUTPATIENT
Start: 2025-09-02

## 2025-12-02 ENCOUNTER — APPOINTMENT (OUTPATIENT)
Dept: PRIMARY CARE | Facility: CLINIC | Age: 55
End: 2025-12-02
Payer: MEDICARE

## 2026-02-20 ENCOUNTER — APPOINTMENT (OUTPATIENT)
Dept: PULMONOLOGY | Facility: CLINIC | Age: 56
End: 2026-02-20
Payer: MEDICARE